# Patient Record
Sex: FEMALE | Race: BLACK OR AFRICAN AMERICAN | NOT HISPANIC OR LATINO | Employment: UNEMPLOYED | URBAN - METROPOLITAN AREA
[De-identification: names, ages, dates, MRNs, and addresses within clinical notes are randomized per-mention and may not be internally consistent; named-entity substitution may affect disease eponyms.]

---

## 2019-09-10 ENCOUNTER — HOSPITAL ENCOUNTER (EMERGENCY)
Facility: HOSPITAL | Age: 29
Discharge: HOME | End: 2019-09-11
Attending: EMERGENCY MEDICINE
Payer: MEDICAID

## 2019-09-10 DIAGNOSIS — J10.1 INFLUENZA B: Primary | ICD-10-CM

## 2019-09-10 DIAGNOSIS — R50.9 ACUTE FEBRILE ILLNESS: ICD-10-CM

## 2019-09-10 LAB
B-HCG UR QL: NEGATIVE
CTP QC/QA: YES
CTP QC/QA: YES
POC MOLECULAR INFLUENZA A AGN: NEGATIVE
POC MOLECULAR INFLUENZA B AGN: POSITIVE

## 2019-09-10 PROCEDURE — 87804 INFLUENZA ASSAY W/OPTIC: CPT | Mod: ER

## 2019-09-10 PROCEDURE — 99284 EMERGENCY DEPT VISIT MOD MDM: CPT | Mod: 25,ER

## 2019-09-10 PROCEDURE — 87502 INFLUENZA DNA AMP PROBE: CPT | Mod: ER

## 2019-09-10 PROCEDURE — 25000003 PHARM REV CODE 250: Mod: ER | Performed by: EMERGENCY MEDICINE

## 2019-09-10 PROCEDURE — 81025 URINE PREGNANCY TEST: CPT | Mod: ER | Performed by: EMERGENCY MEDICINE

## 2019-09-10 RX ORDER — ACETAMINOPHEN 325 MG/1
650 TABLET ORAL
Status: COMPLETED | OUTPATIENT
Start: 2019-09-10 | End: 2019-09-10

## 2019-09-10 RX ADMIN — ACETAMINOPHEN 650 MG: 325 TABLET, FILM COATED ORAL at 11:09

## 2019-09-11 VITALS
HEIGHT: 69 IN | TEMPERATURE: 99 F | BODY MASS INDEX: 43.4 KG/M2 | DIASTOLIC BLOOD PRESSURE: 88 MMHG | HEART RATE: 68 BPM | WEIGHT: 293 LBS | OXYGEN SATURATION: 96 % | RESPIRATION RATE: 20 BRPM | SYSTOLIC BLOOD PRESSURE: 140 MMHG

## 2019-09-11 RX ORDER — FLUTICASONE PROPIONATE 50 MCG
2 SPRAY, SUSPENSION (ML) NASAL DAILY
Qty: 16 G | Refills: 0 | OUTPATIENT
Start: 2019-09-11 | End: 2021-01-28

## 2019-09-11 RX ORDER — MONTELUKAST SODIUM 10 MG/1
10 TABLET ORAL NIGHTLY
Qty: 30 TABLET | Refills: 0 | Status: SHIPPED | OUTPATIENT
Start: 2019-09-11 | End: 2019-10-11

## 2019-09-11 RX ORDER — BENZONATATE 100 MG/1
100 CAPSULE ORAL 3 TIMES DAILY PRN
Qty: 20 CAPSULE | Refills: 0 | Status: SHIPPED | OUTPATIENT
Start: 2019-09-11 | End: 2019-09-21

## 2019-09-11 RX ORDER — PROMETHAZINE HYDROCHLORIDE AND DEXTROMETHORPHAN HYDROBROMIDE 6.25; 15 MG/5ML; MG/5ML
5 SYRUP ORAL NIGHTLY PRN
Qty: 118 ML | Refills: 0 | Status: SHIPPED | OUTPATIENT
Start: 2019-09-11 | End: 2019-09-21

## 2019-09-11 RX ORDER — ALBUTEROL SULFATE 90 UG/1
2 AEROSOL, METERED RESPIRATORY (INHALATION) EVERY 6 HOURS PRN
Qty: 1 INHALER | Refills: 0 | OUTPATIENT
Start: 2019-09-11 | End: 2022-08-16

## 2019-09-11 NOTE — ED PROVIDER NOTES
Encounter Date: 9/10/2019    SCRIBE #1 NOTE: I, Carmen Hernandez, am scribing for, and in the presence of,  Dr. Ward. I have scribed the following portions of the note - Other sections scribed: HPI, ROS, PE.       History     Chief Complaint   Patient presents with    Fever     pt c/o fever, sore throat and asthma attacks and cough x 3 day, pt states is triage that she missed 2 days of work     Zandra Phillips is a 29 y.o. Female with asthma who presents to the ED complaining of fever, sore throat and cough x3 days. Pt reports fever, congestion, and cough. Pt reports taking tylenol and naproxen with little relief. Pt denies SOB, CP or syncope.    The history is provided by the patient. No  was used.     Review of patient's allergies indicates:  No Known Allergies  Past Medical History:   Diagnosis Date    Asthma      Past Surgical History:   Procedure Laterality Date     SECTION      TONSILLECTOMY       Family History   Problem Relation Age of Onset    Diabetes Other     Hypertension Other      Social History     Tobacco Use    Smoking status: Current Some Day Smoker     Types: Cigarettes   Substance Use Topics    Alcohol use: Yes    Drug use: Not Currently     Review of Systems   Constitutional: Positive for fever. Negative for appetite change.   HENT: Positive for congestion, rhinorrhea and sore throat. Negative for trouble swallowing and voice change.    Respiratory: Positive for cough. Negative for shortness of breath.    Cardiovascular: Negative for chest pain.   Gastrointestinal: Negative for diarrhea, nausea and vomiting.   All other systems reviewed and are negative.      Physical Exam     Initial Vitals [09/10/19 2129]   BP Pulse Resp Temp SpO2   (!) 157/101 95 18 (!) 101.7 °F (38.7 °C) (!) 94 %      MAP       --         Physical Exam    Nursing note and vitals reviewed.  Constitutional: She appears well-developed and well-nourished.   HENT:   Head: Normocephalic and  atraumatic.   Nose: Mucosal edema and rhinorrhea present.   Eyes: Conjunctivae are normal.   Neck: Normal range of motion and phonation normal. Neck supple.   Cardiovascular: Normal rate and intact distal pulses.   Pulmonary/Chest: Effort normal and breath sounds normal. No stridor. No respiratory distress. She has no wheezes. She has no rhonchi. She has no rales.   Musculoskeletal: Normal range of motion.   Neurological: She is alert and oriented to person, place, and time.   Skin: Skin is warm and dry. Capillary refill takes less than 2 seconds. No rash noted.   Psychiatric: She has a normal mood and affect. Her behavior is normal.         ED Course   Procedures  Labs Reviewed   POCT INFLUENZA A/B MOLECULAR - Abnormal; Notable for the following components:       Result Value    POC Molecular Influenza B Ag Positive (*)     All other components within normal limits   POCT URINE PREGNANCY          Imaging Results          X-Ray Chest PA And Lateral (Final result)  Result time 09/11/19 00:01:34    Final result by Jimenez Lua MD (09/11/19 00:01:34)                 Impression:      No acute intrathoracic process identified.      Electronically signed by: Jimenez Lua MD  Date:    09/11/2019  Time:    00:01             Narrative:    EXAMINATION:  XR CHEST PA AND LATERAL    CLINICAL HISTORY:  Fever, unspecified    TECHNIQUE:  PA and lateral views of the chest were performed.    COMPARISON:  December 2015.    FINDINGS:  Cardiac silhouette is normal in size.  Lungs are symmetrically expanded.  No evidence of focal consolidative process, pneumothorax, or significant effusion.  No acute osseous abnormality identified.                                 Medical Decision Making:   History:   Old Medical Records: I decided to obtain old medical records.  Clinical Tests:   Lab Tests: Reviewed and Ordered  Radiological Study: Reviewed and Ordered    Labs Reviewed  Admission on 09/10/2019, Discharged on 09/11/2019   Component  Date Value Ref Range Status    POC Molecular Influenza A Ag 09/10/2019 Negative  Negative, Not Reported Final    POC Molecular Influenza B Ag 09/10/2019 Positive* Negative, Not Reported Final     Acceptable 09/10/2019 Yes   Final    POC Preg Test, Ur 09/10/2019 Negative  Negative Final     Acceptable 09/10/2019 Yes   Final        Imaging Reviewed    Imaging Results          X-Ray Chest PA And Lateral (Final result)  Result time 09/11/19 00:01:34    Final result by Jimenez Lua MD (09/11/19 00:01:34)                 Impression:      No acute intrathoracic process identified.      Electronically signed by: Jimenez Lua MD  Date:    09/11/2019  Time:    00:01             Narrative:    EXAMINATION:  XR CHEST PA AND LATERAL    CLINICAL HISTORY:  Fever, unspecified    TECHNIQUE:  PA and lateral views of the chest were performed.    COMPARISON:  December 2015.    FINDINGS:  Cardiac silhouette is normal in size.  Lungs are symmetrically expanded.  No evidence of focal consolidative process, pneumothorax, or significant effusion.  No acute osseous abnormality identified.                                Medications given in ED    Medications   acetaminophen tablet 650 mg (650 mg Oral Given 9/10/19 2300)       This document was produced by a scribe under my direction and in my presence. I agree with the content of the note and have made any necessary edits.     Bakari Ward MD         Note was created using voice recognition software. Note may have occasional typographical errors that may not have been identified and edited despite good mellisa initial review prior to signing.            Scribe Attestation:   Scribe #1: I performed the above scribed service and the documentation accurately describes the services I performed. I attest to the accuracy of the note.           Discharge Medications     Discharge Medication List as of 9/11/2019 12:45 AM      START taking these medications     Details   albuterol (PROVENTIL/VENTOLIN HFA) 90 mcg/actuation inhaler Inhale 2 puffs into the lungs every 6 (six) hours as needed for Wheezing or Shortness of Breath., Starting Wed 9/11/2019, Normal      benzonatate (TESSALON) 100 MG capsule Take 1 capsule (100 mg total) by mouth 3 (three) times daily as needed for Cough., Starting Wed 9/11/2019, Until Sat 9/21/2019, Normal      fluticasone propionate (FLONASE) 50 mcg/actuation nasal spray 2 sprays (100 mcg total) by Each Nostril route once daily., Starting Wed 9/11/2019, Normal      montelukast (SINGULAIR) 10 mg tablet Take 1 tablet (10 mg total) by mouth every evening., Starting Wed 9/11/2019, Until Fri 10/11/2019, Normal      promethazine-dextromethorphan (PROMETHAZINE-DM) 6.25-15 mg/5 mL Syrp Take 5 mLs by mouth nightly as needed (cough)., Starting Wed 9/11/2019, Until Sat 9/21/2019, Normal                   Patient discharged to home in stable condition with instructions to:   1. Please take all meds as prescribed.  2. Follow-up with your primary care doctor   3. Return precautions discussed and patient and/or family/caretaker understands to return to the emergency room for any concerns including worsening of your current symptoms, fever, chills, night sweats, worsening pain, chest pain, shortness of breath, nausea, vomiting, diarrhea, bleeding, headache, difficulty talking, visual disturbances, weakness, numbness or any other acute concerns          Clinical Impression:     1. Influenza B    2. Acute febrile illness            Disposition:   Disposition: Discharged  Condition: Stable                        Bakari Ward MD  09/16/19 4395

## 2020-09-18 ENCOUNTER — HOSPITAL ENCOUNTER (EMERGENCY)
Facility: HOSPITAL | Age: 30
Discharge: HOME | End: 2020-09-18
Attending: INTERNAL MEDICINE
Payer: MEDICAID

## 2020-09-18 VITALS
DIASTOLIC BLOOD PRESSURE: 97 MMHG | TEMPERATURE: 98 F | BODY MASS INDEX: 43.4 KG/M2 | WEIGHT: 293 LBS | HEART RATE: 95 BPM | RESPIRATION RATE: 18 BRPM | HEIGHT: 69 IN | SYSTOLIC BLOOD PRESSURE: 150 MMHG | OXYGEN SATURATION: 98 %

## 2020-09-18 DIAGNOSIS — Z04.1 EXAM FOLLOWING MVC (MOTOR VEHICLE COLLISION), NO APPARENT INJURY: Primary | ICD-10-CM

## 2020-09-18 DIAGNOSIS — M79.631 RIGHT FOREARM PAIN: ICD-10-CM
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
## 2020-09-18 LAB
B-HCG UR QL: NEGATIVE
CTP QC/QA: YES

## 2020-09-18 PROCEDURE — 25000003 PHARM REV CODE 250: Mod: ER | Performed by: INTERNAL MEDICINE

## 2020-09-18 PROCEDURE — 99284 EMERGENCY DEPT VISIT MOD MDM: CPT | Mod: 25,ER

## 2020-09-18 PROCEDURE — 81025 URINE PREGNANCY TEST: CPT | Mod: ER | Performed by: INTERNAL MEDICINE

## 2020-09-18 RX ORDER — METHOCARBAMOL 750 MG/1
1500 TABLET, FILM COATED ORAL 3 TIMES DAILY
Qty: 30 TABLET | Refills: 0 | Status: SHIPPED | OUTPATIENT
Start: 2020-09-18 | End: 2020-09-23

## 2020-09-18 RX ORDER — IBUPROFEN 400 MG/1
800 TABLET ORAL
Status: COMPLETED | OUTPATIENT
Start: 2020-09-18 | End: 2020-09-18

## 2020-09-18 RX ORDER — IBUPROFEN 800 MG/1
800 TABLET ORAL EVERY 8 HOURS PRN
Qty: 30 TABLET | Refills: 0 | Status: SHIPPED | OUTPATIENT
Start: 2020-09-18 | End: 2021-01-28 | Stop reason: ALTCHOICE

## 2020-09-18 RX ORDER — METHOCARBAMOL 750 MG/1
1500 TABLET, FILM COATED ORAL
Status: COMPLETED | OUTPATIENT
Start: 2020-09-18 | End: 2020-09-18

## 2020-09-18 RX ADMIN — IBUPROFEN 800 MG: 400 TABLET ORAL at 11:09

## 2020-09-18 RX ADMIN — METHOCARBAMOL 1500 MG: 750 TABLET ORAL at 11:09
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
## 2021-01-28 ENCOUNTER — HOSPITAL ENCOUNTER (EMERGENCY)
Facility: HOSPITAL | Age: 31
Discharge: HOME | End: 2021-01-28
Attending: EMERGENCY MEDICINE
Payer: MEDICAID

## 2021-01-28 VITALS
HEART RATE: 79 BPM | SYSTOLIC BLOOD PRESSURE: 131 MMHG | WEIGHT: 293 LBS | TEMPERATURE: 98 F | OXYGEN SATURATION: 99 % | DIASTOLIC BLOOD PRESSURE: 73 MMHG | HEIGHT: 69 IN | RESPIRATION RATE: 18 BRPM | BODY MASS INDEX: 43.4 KG/M2

## 2021-01-28 DIAGNOSIS — R51.9 SINUS HEADACHE: Primary | ICD-10-CM

## 2021-01-28 DIAGNOSIS — S39.012A BACK STRAIN, INITIAL ENCOUNTER: ICD-10-CM

## 2021-01-28 LAB
B-HCG UR QL: NEGATIVE
CTP QC/QA: YES

## 2021-01-28 PROCEDURE — 99284 EMERGENCY DEPT VISIT MOD MDM: CPT | Mod: 25,ER

## 2021-01-28 PROCEDURE — 81025 URINE PREGNANCY TEST: CPT | Mod: ER | Performed by: PHYSICIAN ASSISTANT

## 2021-01-28 PROCEDURE — 25000003 PHARM REV CODE 250: Mod: ER | Performed by: EMERGENCY MEDICINE

## 2021-01-28 RX ORDER — METHOCARBAMOL 750 MG/1
1500 TABLET, FILM COATED ORAL EVERY 6 HOURS
Qty: 24 TABLET | Refills: 0 | Status: SHIPPED | OUTPATIENT
Start: 2021-01-28 | End: 2021-01-31

## 2021-01-28 RX ORDER — ACETAMINOPHEN 500 MG
1000 TABLET ORAL
Status: COMPLETED | OUTPATIENT
Start: 2021-01-28 | End: 2021-01-28

## 2021-01-28 RX ORDER — LORATADINE 10 MG/1
10 TABLET ORAL DAILY
Qty: 60 TABLET | Refills: 0 | OUTPATIENT
Start: 2021-01-28 | End: 2022-08-16

## 2021-01-28 RX ORDER — MONTELUKAST SODIUM 10 MG/1
10 TABLET ORAL NIGHTLY
Qty: 30 TABLET | Refills: 0 | Status: SHIPPED | OUTPATIENT
Start: 2021-01-28 | End: 2021-02-27

## 2021-01-28 RX ORDER — FLUTICASONE PROPIONATE 50 MCG
2 SPRAY, SUSPENSION (ML) NASAL DAILY
Qty: 16 G | Refills: 0 | OUTPATIENT
Start: 2021-01-28 | End: 2022-08-16

## 2021-01-28 RX ORDER — METHYLPREDNISOLONE 4 MG/1
TABLET ORAL
Qty: 1 PACKAGE | Refills: 0 | Status: SHIPPED | OUTPATIENT
Start: 2021-01-28 | End: 2021-02-18

## 2021-01-28 RX ORDER — KETOROLAC TROMETHAMINE 10 MG/1
10 TABLET, FILM COATED ORAL EVERY 6 HOURS PRN
Qty: 12 TABLET | Refills: 0 | Status: SHIPPED | OUTPATIENT
Start: 2021-01-28 | End: 2021-01-31

## 2021-01-28 RX ADMIN — ACETAMINOPHEN 1000 MG: 500 TABLET ORAL at 07:01

## 2021-04-16 ENCOUNTER — PATIENT MESSAGE (OUTPATIENT)
Dept: RESEARCH | Facility: HOSPITAL | Age: 31
End: 2021-04-16

## 2022-08-16 ENCOUNTER — HOSPITAL ENCOUNTER (EMERGENCY)
Facility: HOSPITAL | Age: 32
Discharge: HOME | End: 2022-08-16
Attending: EMERGENCY MEDICINE
Payer: MEDICAID

## 2022-08-16 VITALS
DIASTOLIC BLOOD PRESSURE: 95 MMHG | OXYGEN SATURATION: 98 % | BODY MASS INDEX: 59.07 KG/M2 | RESPIRATION RATE: 16 BRPM | TEMPERATURE: 98 F | WEIGHT: 293 LBS | SYSTOLIC BLOOD PRESSURE: 150 MMHG | HEART RATE: 85 BPM

## 2022-08-16 DIAGNOSIS — M25.462 EFFUSION OF LEFT KNEE: ICD-10-CM

## 2022-08-16 DIAGNOSIS — M25.562 LEFT KNEE PAIN: Primary | ICD-10-CM

## 2022-08-16 DIAGNOSIS — M17.12 OSTEOARTHRITIS OF LEFT KNEE, UNSPECIFIED OSTEOARTHRITIS TYPE: ICD-10-CM

## 2022-08-16 LAB
B-HCG UR QL: NEGATIVE
CTP QC/QA: YES

## 2022-08-16 PROCEDURE — 25000003 PHARM REV CODE 250: Mod: ER | Performed by: NURSE PRACTITIONER

## 2022-08-16 PROCEDURE — 81025 URINE PREGNANCY TEST: CPT | Mod: ER | Performed by: NURSE PRACTITIONER

## 2022-08-16 PROCEDURE — 99284 EMERGENCY DEPT VISIT MOD MDM: CPT | Mod: ER

## 2022-08-16 RX ORDER — IBUPROFEN 600 MG/1
600 TABLET ORAL
Status: COMPLETED | OUTPATIENT
Start: 2022-08-16 | End: 2022-08-16

## 2022-08-16 RX ORDER — DICLOFENAC SODIUM 10 MG/G
2 GEL TOPICAL 4 TIMES DAILY
Qty: 100 G | Refills: 0 | Status: SHIPPED | OUTPATIENT
Start: 2022-08-16 | End: 2022-08-30

## 2022-08-16 RX ORDER — IBUPROFEN 600 MG/1
600 TABLET ORAL EVERY 6 HOURS PRN
Qty: 20 TABLET | Refills: 0 | Status: SHIPPED | OUTPATIENT
Start: 2022-08-16 | End: 2022-08-21

## 2022-08-16 RX ORDER — METHOCARBAMOL 500 MG/1
1000 TABLET, FILM COATED ORAL 3 TIMES DAILY
Qty: 30 TABLET | Refills: 0 | Status: SHIPPED | OUTPATIENT
Start: 2022-08-16 | End: 2022-08-21

## 2022-08-16 RX ORDER — DEXTROMETHORPHAN HYDROBROMIDE, GUAIFENESIN 5; 100 MG/5ML; MG/5ML
650 LIQUID ORAL EVERY 8 HOURS
Qty: 20 TABLET | Refills: 0 | Status: SHIPPED | OUTPATIENT
Start: 2022-08-16 | End: 2022-08-21

## 2022-08-16 RX ADMIN — IBUPROFEN 600 MG: 600 TABLET ORAL at 08:08

## 2022-08-16 NOTE — ED PROVIDER NOTES
"Encounter Date: 2022    SCRIBE #1 NOTE: I, Rachel Rodriguessara, am scribing for, and in the presence of,  NAHOMY Robins. I have scribed the following portions of the note - Other sections scribed: HPI, ROS, PE.       History     Chief Complaint   Patient presents with    Knee Pain     Pt states," My left knee has been hurting for weeks, my right knee is hurting now also."     32 year old female with history of Asthma presents to the ED with complaints of bilateral knee pain beginning two weeks ago. Pt notes associated symptoms of bilateral knee swelling, warmth, stiffness, and previous left knee sprain that occurred 'a while ago'. Pt states the pain is worse in the left knee and states the pain is exacerbated with movement. Patient denies rash, fever, chest pain, SOB, numbness, weakness, tingling, abdominal pain, back pain, dysuria, hematuria, nausea, vomiting, diarrhea, or any other complaints. She rates her pain as 9/10 and has not taken any medications for her symptoms today.  Pt does not endorse smoking or drug use. Endorses alcohol use. No known allergies.         The history is provided by the patient. No  was used.     Review of patient's allergies indicates:  No Known Allergies  Past Medical History:   Diagnosis Date    Asthma      Past Surgical History:   Procedure Laterality Date     SECTION      TONSILLECTOMY       Family History   Problem Relation Age of Onset    Diabetes Other     Hypertension Other      Social History     Tobacco Use    Smoking status: Current Some Day Smoker     Types: Cigarettes    Smokeless tobacco: Never Used   Substance Use Topics    Alcohol use: Yes    Drug use: Not Currently     Review of Systems   Constitutional: Negative for chills and fever.        (+) Warmth  (+) Left knee injury   HENT: Negative for congestion, ear pain, rhinorrhea, sore throat and trouble swallowing.    Eyes: Negative for pain, discharge and redness. "   Respiratory: Negative for cough and shortness of breath.    Cardiovascular: Negative for chest pain.   Gastrointestinal: Negative for abdominal pain, diarrhea, nausea and vomiting.   Genitourinary: Negative for decreased urine volume and dysuria.   Musculoskeletal: Positive for arthralgias (Bilateral Knee Pain) and joint swelling (Bilateral Knees). Negative for back pain, neck pain and neck stiffness.        (+) Bilateral knee stiffness   Skin: Negative for rash.   Neurological: Negative for dizziness, weakness, light-headedness, numbness and headaches.   Psychiatric/Behavioral: Negative for confusion.       Physical Exam     Initial Vitals [08/16/22 0819]   BP Pulse Resp Temp SpO2   (!) 150/95 85 16 98.2 °F (36.8 °C) 98 %      MAP       --         Physical Exam    Nursing note and vitals reviewed.  Constitutional: She appears well-developed.  Non-toxic appearance. She does not appear ill.   HENT:   Head: Normocephalic and atraumatic.   Right Ear: External ear normal.   Left Ear: External ear normal.   Nose: Nose normal.   Eyes: Conjunctivae are normal.   Neck:   Normal range of motion.  Cardiovascular: Normal rate and regular rhythm.   Pulses:       Dorsalis pedis pulses are 2+ on the left side.        Posterior tibial pulses are 2+ on the left side.   Pulmonary/Chest: Effort normal and breath sounds normal. She exhibits no tenderness.   Musculoskeletal:      Cervical back: Normal range of motion.      Left knee: No swelling or erythema. Normal range of motion. Tenderness present.      Instability Tests: Anterior drawer test negative. Posterior drawer test negative.      Comments: Generalized left knee tenderness. No swelling, erythema, warmth to touch, bruising, or rash. Skin intact. Normal ROM and normal gait. Negative anterior and posterior drawer. +2 DP/PT pulse; normal gait     Neurological: She is alert and oriented to person, place, and time. Gait normal. GCS eye subscore is 4. GCS verbal subscore is 5.  GCS motor subscore is 6.   Skin: Skin is warm, dry and intact. No bruising and no rash noted. No erythema.   Psychiatric: She has a normal mood and affect. Her speech is normal and behavior is normal. Judgment and thought content normal.         ED Course   Procedures  Labs Reviewed   POCT URINE PREGNANCY          Imaging Results          X-Ray Knee 3 View Left (Final result)  Result time 08/16/22 10:03:27    Final result by Tigre Muse MD (08/16/22 10:03:27)                 Impression:      No fracture dislocation.  Additional findings above.      Electronically signed by: Tigre Muse MD  Date:    08/16/2022  Time:    10:03             Narrative:    EXAMINATION:  XR KNEE 3 VIEW LEFT    CLINICAL HISTORY:  Pain in left knee    TECHNIQUE:  AP, lateral, and Merchant views of the left knee were performed.    COMPARISON:  None    FINDINGS:  Advanced tricompartment DJD change particularly lateral compartment with exuberant osteophytes, joint space narrowing and cortical regularity.  Evidence of subcutaneous edema, genu valgum deformity and small suprapatellar joint effusion.  Less prominent DJD changes medial compartment patellofemoral joint..                                 Medications   ibuprofen tablet 600 mg (600 mg Oral Given 8/16/22 0855)     Medical Decision Making:   Independently Interpreted Test(s):   I have ordered and independently interpreted X-rays - see prior notes.  Clinical Tests:   Lab Tests: Ordered and Reviewed  The following lab test(s) were unremarkable: UPT  Radiological Study: Ordered and Reviewed       APC / Resident Notes:   This is an evaluation of a 32 y.o. female that presents to the Emergency Department for left knee pain    Physical Exam shows a non-toxic, afebrile, and well appearing female. Generalized left knee tenderness. No swelling, erythema, warmth to touch, bruising, or rash. Skin intact. Normal ROM and normal gait. Negative anterior and posterior drawer. +2 DP/PT  pulse; normal gait    Vital signs are reassuring. If available, previous records reviewed.   RESULTS: UPT negative; Xray showed DJD and small joint effusion    My overall impression is Left knee pain. I considered, but at this time, do not suspect fracture, dislocation, abscess, cellulitis.    ED Course: UPT, Ibuprofen, Xray. Discharge Meds/Instructions: tylenol, Ibuprofen, Robaxin, Voltaren gel, Ortho referral. The diagnosis, treatment plan, instructions for follow-up as well as ED return precautions were discussed. All questions have been answered.            Scribe Attestation:   Scribe #1: I performed the above scribed service and the documentation accurately describes the services I performed. I attest to the accuracy of the note.               I, SONG Dutton, personally performed the services described in this documentation.  All medical record entries made by the scribe were at my direction and in my presence.  I have reviewed the chart and agree that the record reflects my personal performance and is accurate and complete.  Clinical Impression:   Final diagnoses:  [M25.562] Left knee pain (Primary)  [M17.12] Osteoarthritis of left knee, unspecified osteoarthritis type  [M25.462] Effusion of left knee          ED Disposition Condition    Discharge Stable        ED Prescriptions     Medication Sig Dispense Start Date End Date Auth. Provider    diclofenac sodium (VOLTAREN) 1 % Gel Apply 2 g topically 4 (four) times daily. for 14 days 100 g 8/16/2022 8/30/2022 NAHOMY August    acetaminophen (TYLENOL) 650 MG TbSR Take 1 tablet (650 mg total) by mouth every 8 (eight) hours. for 5 days 20 tablet 8/16/2022 8/21/2022 NAHOMY August    ibuprofen (ADVIL,MOTRIN) 600 MG tablet Take 1 tablet (600 mg total) by mouth every 6 (six) hours as needed for Pain. 20 tablet 8/16/2022 8/21/2022 NAHOMY August    methocarbamoL (ROBAXIN) 500 MG Tab Take 2 tablets (1,000 mg total) by mouth 3 (three) times daily. for 5  days 30 tablet 8/16/2022 8/21/2022 NAHOMY August        Follow-up Information     Follow up With Specialties Details Why Contact Info    [facility] - Musculosketetal Neuromusculoskeletal Medicine Schedule an appointment as soon as possible for a visit in 2 days  [address] [facility] [address]  [phone]      [facility] ED Emergency Medicine Go to  If symptoms worsen [address] [facility] [id number] [phone]           NAHOMY August  08/16/22 1010

## 2022-08-16 NOTE — Clinical Note
"Zandra Kennedyady Phillips was seen and treated in our emergency department on 8/16/2022.  She may return to work on 08/18/2022.       If you have any questions or concerns, please don't hesitate to call.      NAHOMY August"

## 2022-09-11 ENCOUNTER — HOSPITAL ENCOUNTER (INPATIENT)
Facility: HOSPITAL | Age: 32
LOS: 3 days | Discharge: HOME | DRG: 193 | End: 2022-09-14
Attending: EMERGENCY MEDICINE | Admitting: HOSPITALIST
Payer: MEDICAID

## 2022-09-11 DIAGNOSIS — J45.41 MODERATE PERSISTENT ASTHMA WITH EXACERBATION: ICD-10-CM

## 2022-09-11 DIAGNOSIS — J18.9 PNEUMONIA OF LEFT LOWER LOBE DUE TO INFECTIOUS ORGANISM: Primary | ICD-10-CM

## 2022-09-11 DIAGNOSIS — R07.9 CHEST PAIN: ICD-10-CM

## 2022-09-11 DIAGNOSIS — R06.00 DYSPNEA, UNSPECIFIED TYPE: ICD-10-CM

## 2022-09-11 LAB
ALBUMIN SERPL-MCNC: 3.6 G/DL (ref 3.3–5.5)
ALLENS TEST: ABNORMAL
ALP SERPL-CCNC: 61 U/L (ref 42–141)
B-HCG UR QL: NEGATIVE
BILIRUB SERPL-MCNC: 0.6 MG/DL (ref 0.2–1.6)
BUN SERPL-MCNC: 9 MG/DL (ref 7–22)
CALCIUM SERPL-MCNC: 9.3 MG/DL (ref 8–10.3)
CHLORIDE SERPL-SCNC: 105 MMOL/L (ref 98–108)
CREAT SERPL-MCNC: 0.8 MG/DL (ref 0.6–1.2)
CTP QC/QA: YES
CTP QC/QA: YES
DELSYS: ABNORMAL
GLUCOSE SERPL-MCNC: 143 MG/DL (ref 73–118)
HCO3 UR-SCNC: 18.4 MMOL/L (ref 24–28)
INFLUENZA A ANTIGEN, POC: NEGATIVE
INFLUENZA B ANTIGEN, POC: NEGATIVE
LDH SERPL L TO P-CCNC: 1.06 MMOL/L (ref 0.5–2.2)
PCO2 BLDA: 27.7 MMHG (ref 35–45)
PH SMN: 7.43 [PH] (ref 7.35–7.45)
PO2 BLDA: 49 MMHG (ref 40–60)
POC ALT (SGPT): <5 U/L (ref 10–47)
POC AST (SGOT): 23 U/L (ref 11–38)
POC BE: -4 MMOL/L
POC SATURATED O2: 86 % (ref 95–100)
POC TCO2: 19 MMOL/L (ref 18–33)
POC TCO2: 19 MMOL/L (ref 24–29)
POTASSIUM BLD-SCNC: 3.8 MMOL/L (ref 3.6–5.1)
PROTEIN, POC: 7.1 G/DL (ref 6.4–8.1)
SAMPLE: ABNORMAL
SARS-COV-2 RDRP RESP QL NAA+PROBE: NEGATIVE
SITE: ABNORMAL
SODIUM BLD-SCNC: 134 MMOL/L (ref 128–145)

## 2022-09-11 PROCEDURE — U0002 COVID-19 LAB TEST NON-CDC: HCPCS | Mod: ER | Performed by: NURSE PRACTITIONER

## 2022-09-11 PROCEDURE — 94761 N-INVAS EAR/PLS OXIMETRY MLT: CPT | Mod: ER

## 2022-09-11 PROCEDURE — 85025 COMPLETE CBC W/AUTO DIFF WBC: CPT | Mod: ER

## 2022-09-11 PROCEDURE — 27000221 HC OXYGEN, UP TO 24 HOURS: Mod: ER

## 2022-09-11 PROCEDURE — 87040 BLOOD CULTURE FOR BACTERIA: CPT | Performed by: NURSE PRACTITIONER

## 2022-09-11 PROCEDURE — 63600175 PHARM REV CODE 636 W HCPCS: Mod: ER | Performed by: NURSE PRACTITIONER

## 2022-09-11 PROCEDURE — 94640 AIRWAY INHALATION TREATMENT: CPT | Mod: ER,XB

## 2022-09-11 PROCEDURE — 25000242 PHARM REV CODE 250 ALT 637 W/ HCPCS: Mod: ER | Performed by: NURSE PRACTITIONER

## 2022-09-11 PROCEDURE — 94760 N-INVAS EAR/PLS OXIMETRY 1: CPT | Mod: ER

## 2022-09-11 PROCEDURE — 21400001 HC TELEMETRY ROOM

## 2022-09-11 PROCEDURE — 82803 BLOOD GASES ANY COMBINATION: CPT | Mod: ER

## 2022-09-11 PROCEDURE — 96372 THER/PROPH/DIAG INJ SC/IM: CPT | Performed by: NURSE PRACTITIONER

## 2022-09-11 PROCEDURE — 81025 URINE PREGNANCY TEST: CPT | Mod: ER | Performed by: NURSE PRACTITIONER

## 2022-09-11 PROCEDURE — 80053 COMPREHEN METABOLIC PANEL: CPT | Mod: ER

## 2022-09-11 RX ORDER — LANOLIN ALCOHOL/MO/W.PET/CERES
800 CREAM (GRAM) TOPICAL
Status: DISCONTINUED | OUTPATIENT
Start: 2022-09-11 | End: 2022-09-14 | Stop reason: HOSPADM

## 2022-09-11 RX ORDER — NALOXONE HCL 0.4 MG/ML
0.02 VIAL (ML) INJECTION
Status: DISCONTINUED | OUTPATIENT
Start: 2022-09-11 | End: 2022-09-14 | Stop reason: HOSPADM

## 2022-09-11 RX ORDER — AMOXICILLIN 250 MG
1 CAPSULE ORAL DAILY PRN
Status: DISCONTINUED | OUTPATIENT
Start: 2022-09-11 | End: 2022-09-14 | Stop reason: HOSPADM

## 2022-09-11 RX ORDER — TALC
6 POWDER (GRAM) TOPICAL NIGHTLY PRN
Status: DISCONTINUED | OUTPATIENT
Start: 2022-09-11 | End: 2022-09-13

## 2022-09-11 RX ORDER — GLUCAGON 1 MG
1 KIT INJECTION
Status: DISCONTINUED | OUTPATIENT
Start: 2022-09-11 | End: 2022-09-14 | Stop reason: HOSPADM

## 2022-09-11 RX ORDER — SODIUM CHLORIDE 0.9 % (FLUSH) 0.9 %
10 SYRINGE (ML) INJECTION EVERY 8 HOURS
Status: DISCONTINUED | OUTPATIENT
Start: 2022-09-11 | End: 2022-09-14 | Stop reason: HOSPADM

## 2022-09-11 RX ORDER — DOXYCYCLINE HYCLATE 100 MG
100 TABLET ORAL EVERY 12 HOURS
Status: DISCONTINUED | OUTPATIENT
Start: 2022-09-12 | End: 2022-09-12

## 2022-09-11 RX ORDER — KETOROLAC TROMETHAMINE 30 MG/ML
15 INJECTION, SOLUTION INTRAMUSCULAR; INTRAVENOUS
Status: COMPLETED | OUTPATIENT
Start: 2022-09-11 | End: 2022-09-11

## 2022-09-11 RX ORDER — ACETAMINOPHEN 325 MG/1
650 TABLET ORAL EVERY 4 HOURS PRN
Status: DISCONTINUED | OUTPATIENT
Start: 2022-09-11 | End: 2022-09-14 | Stop reason: HOSPADM

## 2022-09-11 RX ORDER — IBUPROFEN 200 MG
24 TABLET ORAL
Status: DISCONTINUED | OUTPATIENT
Start: 2022-09-11 | End: 2022-09-14 | Stop reason: HOSPADM

## 2022-09-11 RX ORDER — METHYLPREDNISOLONE SOD SUCC 125 MG
125 VIAL (EA) INJECTION
Status: COMPLETED | OUTPATIENT
Start: 2022-09-11 | End: 2022-09-11

## 2022-09-11 RX ORDER — ALBUTEROL SULFATE 2.5 MG/.5ML
2.5 SOLUTION RESPIRATORY (INHALATION) EVERY 4 HOURS PRN
Status: DISCONTINUED | OUTPATIENT
Start: 2022-09-11 | End: 2022-09-14 | Stop reason: HOSPADM

## 2022-09-11 RX ORDER — IBUPROFEN 200 MG
16 TABLET ORAL
Status: DISCONTINUED | OUTPATIENT
Start: 2022-09-11 | End: 2022-09-14 | Stop reason: HOSPADM

## 2022-09-11 RX ORDER — IPRATROPIUM BROMIDE AND ALBUTEROL SULFATE 2.5; .5 MG/3ML; MG/3ML
3 SOLUTION RESPIRATORY (INHALATION)
Status: COMPLETED | OUTPATIENT
Start: 2022-09-11 | End: 2022-09-11

## 2022-09-11 RX ADMIN — IPRATROPIUM BROMIDE AND ALBUTEROL SULFATE 3 ML: .5; 3 SOLUTION RESPIRATORY (INHALATION) at 04:09

## 2022-09-11 RX ADMIN — PIPERACILLIN AND TAZOBACTAM 4.5 G: 4; .5 INJECTION, POWDER, LYOPHILIZED, FOR SOLUTION INTRAVENOUS; PARENTERAL at 06:09

## 2022-09-11 RX ADMIN — KETOROLAC TROMETHAMINE 15 MG: 30 INJECTION, SOLUTION INTRAMUSCULAR; INTRAVENOUS at 07:09

## 2022-09-11 RX ADMIN — ALBUTEROL SULFATE 2.5 MG: 2.5 SOLUTION RESPIRATORY (INHALATION) at 05:09

## 2022-09-11 RX ADMIN — METHYLPREDNISOLONE SODIUM SUCCINATE 125 MG: 125 INJECTION, POWDER, FOR SOLUTION INTRAMUSCULAR; INTRAVENOUS at 04:09

## 2022-09-11 RX ADMIN — IPRATROPIUM BROMIDE AND ALBUTEROL SULFATE 3 ML: .5; 3 SOLUTION RESPIRATORY (INHALATION) at 05:09

## 2022-09-12 PROBLEM — J45.909 ASTHMA: Status: ACTIVE | Noted: 2022-09-12

## 2022-09-12 PROBLEM — I10 HTN (HYPERTENSION): Status: ACTIVE | Noted: 2022-09-12

## 2022-09-12 PROBLEM — E66.01 MORBID OBESITY: Status: ACTIVE | Noted: 2022-09-12

## 2022-09-12 PROBLEM — J18.9 PNEUMONIA: Status: ACTIVE | Noted: 2022-09-12

## 2022-09-12 LAB
ALBUMIN SERPL BCP-MCNC: 3.2 G/DL (ref 3.5–5.2)
ALP SERPL-CCNC: 59 U/L (ref 55–135)
ALT SERPL W/O P-5'-P-CCNC: 10 U/L (ref 10–44)
ANION GAP SERPL CALC-SCNC: 12 MMOL/L (ref 8–16)
AST SERPL-CCNC: 15 U/L (ref 10–40)
BASOPHILS # BLD AUTO: 0.01 K/UL (ref 0–0.2)
BASOPHILS NFR BLD: 0.1 % (ref 0–1.9)
BILIRUB SERPL-MCNC: 0.4 MG/DL (ref 0.1–1)
BUN SERPL-MCNC: 12 MG/DL (ref 6–20)
CALCIUM SERPL-MCNC: 8.8 MG/DL (ref 8.7–10.5)
CHLORIDE SERPL-SCNC: 107 MMOL/L (ref 95–110)
CO2 SERPL-SCNC: 18 MMOL/L (ref 23–29)
CREAT SERPL-MCNC: 0.8 MG/DL (ref 0.5–1.4)
DIFFERENTIAL METHOD: ABNORMAL
EOSINOPHIL # BLD AUTO: 0 K/UL (ref 0–0.5)
EOSINOPHIL NFR BLD: 0 % (ref 0–8)
ERYTHROCYTE [DISTWIDTH] IN BLOOD BY AUTOMATED COUNT: 13.8 % (ref 11.5–14.5)
EST. GFR  (NO RACE VARIABLE): >60 ML/MIN/1.73 M^2
GLUCOSE SERPL-MCNC: 193 MG/DL (ref 70–110)
HCT VFR BLD AUTO: 41.9 % (ref 37–48.5)
HGB BLD-MCNC: 13.9 G/DL (ref 12–16)
IMM GRANULOCYTES # BLD AUTO: 0.05 K/UL (ref 0–0.04)
IMM GRANULOCYTES NFR BLD AUTO: 0.5 % (ref 0–0.5)
LYMPHOCYTES # BLD AUTO: 1 K/UL (ref 1–4.8)
LYMPHOCYTES NFR BLD: 10.4 % (ref 18–48)
MCH RBC QN AUTO: 28.4 PG (ref 27–31)
MCHC RBC AUTO-ENTMCNC: 33.2 G/DL (ref 32–36)
MCV RBC AUTO: 86 FL (ref 82–98)
MONOCYTES # BLD AUTO: 0.3 K/UL (ref 0.3–1)
MONOCYTES NFR BLD: 3.3 % (ref 4–15)
NEUTROPHILS # BLD AUTO: 8 K/UL (ref 1.8–7.7)
NEUTROPHILS NFR BLD: 85.7 % (ref 38–73)
NRBC BLD-RTO: 0 /100 WBC
PLATELET # BLD AUTO: 289 K/UL (ref 150–450)
PMV BLD AUTO: 9.2 FL (ref 9.2–12.9)
POTASSIUM SERPL-SCNC: 3.7 MMOL/L (ref 3.5–5.1)
PROT SERPL-MCNC: 7.4 G/DL (ref 6–8.4)
RBC # BLD AUTO: 4.89 M/UL (ref 4–5.4)
SODIUM SERPL-SCNC: 137 MMOL/L (ref 136–145)
WBC # BLD AUTO: 9.29 K/UL (ref 3.9–12.7)

## 2022-09-12 PROCEDURE — 25000003 PHARM REV CODE 250: Performed by: NURSE PRACTITIONER

## 2022-09-12 PROCEDURE — 25000003 PHARM REV CODE 250: Performed by: PHYSICIAN ASSISTANT

## 2022-09-12 PROCEDURE — 63700000 PHARM REV CODE 250 ALT 637 W/O HCPCS: Performed by: NURSE PRACTITIONER

## 2022-09-12 PROCEDURE — 96372 THER/PROPH/DIAG INJ SC/IM: CPT | Performed by: NURSE PRACTITIONER

## 2022-09-12 PROCEDURE — 94664 DEMO&/EVAL PT USE INHALER: CPT | Mod: XB

## 2022-09-12 PROCEDURE — G0378 HOSPITAL OBSERVATION PER HR: HCPCS

## 2022-09-12 PROCEDURE — 36415 COLL VENOUS BLD VENIPUNCTURE: CPT | Performed by: PHYSICIAN ASSISTANT

## 2022-09-12 PROCEDURE — 27100107 HC POCKET PEAK FLOW METER

## 2022-09-12 PROCEDURE — 80053 COMPREHEN METABOLIC PANEL: CPT | Performed by: PHYSICIAN ASSISTANT

## 2022-09-12 PROCEDURE — 25000242 PHARM REV CODE 250 ALT 637 W/ HCPCS: Performed by: NURSE PRACTITIONER

## 2022-09-12 PROCEDURE — 63600175 PHARM REV CODE 636 W HCPCS: Performed by: NURSE PRACTITIONER

## 2022-09-12 PROCEDURE — 96366 THER/PROPH/DIAG IV INF ADDON: CPT

## 2022-09-12 PROCEDURE — 85025 COMPLETE CBC W/AUTO DIFF WBC: CPT | Performed by: PHYSICIAN ASSISTANT

## 2022-09-12 PROCEDURE — 96375 TX/PRO/DX INJ NEW DRUG ADDON: CPT

## 2022-09-12 PROCEDURE — 63600175 PHARM REV CODE 636 W HCPCS: Performed by: PHYSICIAN ASSISTANT

## 2022-09-12 PROCEDURE — 94640 AIRWAY INHALATION TREATMENT: CPT | Mod: XB

## 2022-09-12 PROCEDURE — 27000221 HC OXYGEN, UP TO 24 HOURS

## 2022-09-12 PROCEDURE — 99285 EMERGENCY DEPT VISIT HI MDM: CPT | Mod: 25

## 2022-09-12 PROCEDURE — 94760 N-INVAS EAR/PLS OXIMETRY 1: CPT

## 2022-09-12 PROCEDURE — A4216 STERILE WATER/SALINE, 10 ML: HCPCS | Performed by: PHYSICIAN ASSISTANT

## 2022-09-12 PROCEDURE — 27000646 HC AEROBIKA DEVICE

## 2022-09-12 PROCEDURE — 96365 THER/PROPH/DIAG IV INF INIT: CPT

## 2022-09-12 PROCEDURE — 21400001 HC TELEMETRY ROOM

## 2022-09-12 PROCEDURE — 99900035 HC TECH TIME PER 15 MIN (STAT)

## 2022-09-12 RX ORDER — BENZONATATE 100 MG/1
100 CAPSULE ORAL 3 TIMES DAILY PRN
Status: DISCONTINUED | OUTPATIENT
Start: 2022-09-12 | End: 2022-09-14 | Stop reason: HOSPADM

## 2022-09-12 RX ORDER — BENZONATATE 100 MG/1
100 CAPSULE ORAL ONCE
Status: COMPLETED | OUTPATIENT
Start: 2022-09-12 | End: 2022-09-12

## 2022-09-12 RX ORDER — GUAIFENESIN 100 MG/5ML
200 SOLUTION ORAL EVERY 4 HOURS PRN
Status: DISCONTINUED | OUTPATIENT
Start: 2022-09-12 | End: 2022-09-14 | Stop reason: HOSPADM

## 2022-09-12 RX ORDER — IPRATROPIUM BROMIDE AND ALBUTEROL SULFATE 2.5; .5 MG/3ML; MG/3ML
3 SOLUTION RESPIRATORY (INHALATION) ONCE
Status: COMPLETED | OUTPATIENT
Start: 2022-09-12 | End: 2022-09-12

## 2022-09-12 RX ORDER — SODIUM CHLORIDE FOR INHALATION 3 %
3 VIAL, NEBULIZER (ML) INHALATION
Status: DISCONTINUED | OUTPATIENT
Start: 2022-09-12 | End: 2022-09-14 | Stop reason: HOSPADM

## 2022-09-12 RX ORDER — IBUPROFEN 800 MG/1
800 TABLET ORAL EVERY 6 HOURS PRN
COMMUNITY

## 2022-09-12 RX ORDER — SODIUM CHLORIDE FOR INHALATION 3 %
4 VIAL, NEBULIZER (ML) INHALATION EVERY 6 HOURS PRN
Status: DISCONTINUED | OUTPATIENT
Start: 2022-09-12 | End: 2022-09-14 | Stop reason: HOSPADM

## 2022-09-12 RX ORDER — ENOXAPARIN SODIUM 100 MG/ML
40 INJECTION SUBCUTANEOUS EVERY 24 HOURS
Status: DISCONTINUED | OUTPATIENT
Start: 2022-09-13 | End: 2022-09-14 | Stop reason: HOSPADM

## 2022-09-12 RX ORDER — IBUPROFEN 400 MG/1
800 TABLET ORAL EVERY 6 HOURS PRN
Status: DISCONTINUED | OUTPATIENT
Start: 2022-09-12 | End: 2022-09-14 | Stop reason: HOSPADM

## 2022-09-12 RX ORDER — IPRATROPIUM BROMIDE AND ALBUTEROL SULFATE 2.5; .5 MG/3ML; MG/3ML
3 SOLUTION RESPIRATORY (INHALATION)
Status: DISCONTINUED | OUTPATIENT
Start: 2022-09-12 | End: 2022-09-14 | Stop reason: HOSPADM

## 2022-09-12 RX ORDER — GUAIFENESIN 600 MG/1
600 TABLET, EXTENDED RELEASE ORAL 2 TIMES DAILY
Status: DISCONTINUED | OUTPATIENT
Start: 2022-09-12 | End: 2022-09-14 | Stop reason: HOSPADM

## 2022-09-12 RX ORDER — AZITHROMYCIN 250 MG/1
500 TABLET, FILM COATED ORAL DAILY
Status: COMPLETED | OUTPATIENT
Start: 2022-09-12 | End: 2022-09-14

## 2022-09-12 RX ORDER — ENOXAPARIN SODIUM 100 MG/ML
1 INJECTION SUBCUTANEOUS
Status: DISCONTINUED | OUTPATIENT
Start: 2022-09-12 | End: 2022-09-12

## 2022-09-12 RX ADMIN — ALBUTEROL SULFATE 2.5 MG: 2.5 SOLUTION RESPIRATORY (INHALATION) at 11:09

## 2022-09-12 RX ADMIN — IPRATROPIUM BROMIDE AND ALBUTEROL SULFATE 3 ML: 2.5; .5 SOLUTION RESPIRATORY (INHALATION) at 12:09

## 2022-09-12 RX ADMIN — AZITHROMYCIN MONOHYDRATE 500 MG: 250 TABLET ORAL at 12:09

## 2022-09-12 RX ADMIN — METHYLPREDNISOLONE SODIUM SUCCINATE 40 MG: 40 INJECTION, POWDER, FOR SOLUTION INTRAMUSCULAR; INTRAVENOUS at 08:09

## 2022-09-12 RX ADMIN — IBUPROFEN 800 MG: 400 TABLET ORAL at 03:09

## 2022-09-12 RX ADMIN — CEFTRIAXONE 1 G: 1 INJECTION, SOLUTION INTRAVENOUS at 10:09

## 2022-09-12 RX ADMIN — ENOXAPARIN SODIUM 180 MG: 100 INJECTION SUBCUTANEOUS at 05:09

## 2022-09-12 RX ADMIN — GUAIFENESIN 200 MG: 200 SOLUTION ORAL at 07:09

## 2022-09-12 RX ADMIN — BENZONATATE 100 MG: 100 CAPSULE ORAL at 11:09

## 2022-09-12 RX ADMIN — MELATONIN TAB 3 MG 6 MG: 3 TAB at 03:09

## 2022-09-12 RX ADMIN — DOXYCYCLINE HYCLATE 100 MG: 100 TABLET, COATED ORAL at 08:09

## 2022-09-12 RX ADMIN — SODIUM CHLORIDE 30 MG/ML INHALATION SOLUTION 3 ML: 30 SOLUTION INHALANT at 04:09

## 2022-09-12 RX ADMIN — IPRATROPIUM BROMIDE AND ALBUTEROL SULFATE 3 ML: 2.5; .5 SOLUTION RESPIRATORY (INHALATION) at 04:09

## 2022-09-12 RX ADMIN — GUAIFENESIN 200 MG: 200 SOLUTION ORAL at 11:09

## 2022-09-12 RX ADMIN — Medication 10 ML: at 01:09

## 2022-09-12 RX ADMIN — Medication 10 ML: at 03:09

## 2022-09-12 RX ADMIN — GUAIFENESIN 600 MG: 600 TABLET, EXTENDED RELEASE ORAL at 08:09

## 2022-09-12 RX ADMIN — IBUPROFEN 800 MG: 400 TABLET ORAL at 08:09

## 2022-09-12 RX ADMIN — IPRATROPIUM BROMIDE AND ALBUTEROL SULFATE 3 ML: 2.5; .5 SOLUTION RESPIRATORY (INHALATION) at 07:09

## 2022-09-12 RX ADMIN — Medication 10 ML: at 10:09

## 2022-09-12 RX ADMIN — IPRATROPIUM BROMIDE AND ALBUTEROL SULFATE 3 ML: .5; 3 SOLUTION RESPIRATORY (INHALATION) at 09:09

## 2022-09-12 RX ADMIN — ALBUTEROL SULFATE 2.5 MG: 2.5 SOLUTION RESPIRATORY (INHALATION) at 03:09

## 2022-09-12 RX ADMIN — ACETAMINOPHEN 650 MG: 325 TABLET ORAL at 10:09

## 2022-09-12 RX ADMIN — MELATONIN TAB 3 MG 6 MG: 3 TAB at 08:09

## 2022-09-12 NOTE — PLAN OF CARE
09/12/22 1307   Discharge Planning   Assessment Type Discharge Planning Brief Assessment   Resource/Environmental Concerns none   Support Systems Family members;Parent   Equipment Currently Used at Home none   Current Living Arrangements home/apartment/condo   Patient/Family Anticipates Transition to home with family   Patient/Family Anticipated Services at Transition none   DME Needed Upon Discharge  none   Discharge Plan A Home with family  (with [facility] information)     [id number] - [place] - [address] AT [facility]  [address]  [place] [address]  Phone: [phone] Fax: [phone]    [id number] - [place] - [address] AT [facility]  [address]  [place] [address]  Phone: [phone] Fax: [phone]

## 2022-09-12 NOTE — NURSING
Patient c/o SOB, sats noted 91%.  Patient lying in bed on side, performed deep breathing and coughing, respiratory at bedside to educate on acapella use. Increased oxygen from 2L/NC to 4L/NC, sats noted 97%.  Patient states she feels comfortable on the 4L/NC.  Will continue to monitor.

## 2022-09-12 NOTE — NURSING
Sats noted 94% on 4L/NC.  Patient states she feels comfortable.  Denies chest pain or SOB at the present time.  Call bell in reach, will continue to monitor.

## 2022-09-12 NOTE — CARE UPDATE
I reviewed [facility] assessment and plan. Please see for details.   Admission for asthma exacerbation due to LML PNA  Never been intubated for asthma. Denies smoking. Use inhaler less then once a week prior to this episode.  Productive cough-yellow sputum. Currently afebrile. 92)% on 2 L NC then increase 3 L NC  Seen after duoneb treatment- scattered expiratory rales, wheezes LML, LLL  Continue Rocephin, steroid, add azithromycin, duoneb    Clari Mya NP  Staff : Urmila Raphael MD

## 2022-09-12 NOTE — ASSESSMENT & PLAN NOTE
Body mass index is 57.03 kg/m². Morbid obesity complicates all aspects of disease management from diagnostic modalities to treatment. Weight loss encouraged and health benefits explained to patient.

## 2022-09-12 NOTE — NURSING
Home Oxygen Evaluation    Date Performed:2022    1) Patient's Home O2 Sat on room air, while at rest: 94%        If O2 sats on room air at rest are 88% or below, patient qualifies. No additional testing needed. Document N/A in steps 2 and 3. If 89% or above, complete steps 2.      2) Patient's O2 Sat on room air while exercisin%        If O2 sats on room air while exercising remain 89% or above patient does not qualify, no further testing needed Document N/A in step 3. If O2 sats on room air while exercising are 88% or below, continue to step 3.

## 2022-09-12 NOTE — ED PROVIDER NOTES
Encounter Date: 2022       History     Chief Complaint   Patient presents with    URI     Pt has a a productive cough, NVD, states she has asthma and the albuterol is not helping that has been getting worse over the last four days. It is worse at night.      31 y/o female presents to the ED with complaints of productive cough and worsening asthma when walking for 4 days.  She states that her typical asthma medications is not working, last used albuterol inhaler several hours PTA.  Patient denies rash, fever, chest pain, numbness, weakness, tingling, abdominal pain, back pain, dysuria, hematuria, nausea, vomiting, diarrhea, or any other complaints.  Patient denies pain at present and has taken her medications for the symptoms with no relief.  No Alleviating factors.              The history is provided by the patient.   Review of patient's allergies indicates:  No Known Allergies  Past Medical History:   Diagnosis Date    Asthma      Past Surgical History:   Procedure Laterality Date     SECTION      TONSILLECTOMY       Family History   Problem Relation Age of Onset    Diabetes Other     Hypertension Other      Social History     Tobacco Use    Smoking status: Some Days     Types: Cigarettes    Smokeless tobacco: Never   Substance Use Topics    Alcohol use: Yes    Drug use: Not Currently     Review of Systems   Constitutional:  Negative for chills and fever.   HENT:  Negative for congestion, ear pain, rhinorrhea, sore throat and trouble swallowing.    Eyes:  Negative for pain, discharge and redness.   Respiratory:  Positive for cough, shortness of breath and wheezing.    Cardiovascular:  Negative for chest pain.   Gastrointestinal:  Negative for abdominal pain, diarrhea, nausea and vomiting.   Genitourinary:  Negative for decreased urine volume and dysuria.   Musculoskeletal:  Negative for back pain, neck pain and neck stiffness.   Skin:  Negative for rash.   Neurological:  Negative for dizziness,  weakness, light-headedness, numbness and headaches.   Psychiatric/Behavioral:  Negative for confusion.      Physical Exam     Initial Vitals [09/11/22 1626]   BP Pulse Resp Temp SpO2   (!) 158/104 95 (!) 26 99.6 °F (37.6 °C) (!) 93 %      MAP       --         Physical Exam    Nursing note and vitals reviewed.  Constitutional: She appears well-developed.  Non-toxic appearance. She does not appear ill.   HENT:   Head: Normocephalic and atraumatic.   Right Ear: Tympanic membrane and ear canal normal.   Left Ear: Tympanic membrane and ear canal normal.   Nose: Mucosal edema present.   Mouth/Throat: Uvula is midline, oropharynx is clear and moist and mucous membranes are normal.   Eyes: Conjunctivae are normal.   Neck: No Brudzinski's sign and no Kernig's sign noted.   Normal range of motion.  Cardiovascular:  Normal rate and regular rhythm.           Pulmonary/Chest: Tachypnea noted. She has wheezes. She has rhonchi. She exhibits no tenderness.   Mild expiratory wheezing bilaterally with diminished breath sounds on the left on initial exam; post breathing treatments, rhonchi noted bilaterally; continued tachypnea   Abdominal: Abdomen is soft. There is no abdominal tenderness.   Musculoskeletal:      Cervical back: Normal range of motion.     Lymphadenopathy:     She has no cervical adenopathy.   Neurological: She is alert and oriented to person, place, and time. Gait normal. GCS eye subscore is 4. GCS verbal subscore is 5. GCS motor subscore is 6.   Skin: Skin is warm, dry and intact. No rash noted.   Psychiatric: She has a normal mood and affect. Her speech is normal and behavior is normal. Judgment and thought content normal.   .[email or web address]    ED Course   Procedures  Labs Reviewed   ISTAT PROCEDURE - Abnormal; Notable for the following components:       Result Value    POC PCO2 27.7 (*)     POC HCO3 18.4 (*)     POC SATURATED O2 86 (*)     POC TCO2 19 (*)     All other components within normal limits   POCT CMP -  Abnormal; Notable for the following components:    ALT (SGPT), POC <5 (*)     POC Glucose 143 (*)     All other components within normal limits   CULTURE, BLOOD   CULTURE, BLOOD   SARS-COV-2 RDRP GENE    Narrative:     This test utilizes isothermal nucleic acid amplification   technology to detect the SARS-CoV-2 RdRp nucleic acid segment.   The analytical sensitivity (limit of detection) is 125 genome   equivalents/mL.   A POSITIVE result implies infection with the SARS-CoV-2 virus;   the patient is presumed to be contagious.     A NEGATIVE result means that SARS-CoV-2 nucleic acids are not   present above the limit of detection. A NEGATIVE result should be   treated as presumptive. It does not rule out the possibility of   COVID-19 and should not be the sole basis for treatment decisions.   If COVID-19 is strongly suspected based on clinical and exposure   history, re-testing using an alternate molecular assay should be   considered.   This test is only for use under the Food and Drug   Administration s Emergency Use Authorization (EUA).   Commercial kits are provided by [employer].   Performance characteristics of the EUA have been independently   verified by [facility] Department of   Pathology and Laboratory Medicine.   _________________________________________________________________   The authorized Fact Sheet for Healthcare Providers and the authorized Fact   Sheet for Patients of the ID NOW COVID-19 are available on the FDA   website:     https://www.fda.gov/media/596572/download  https://www.fda.gov/media/882315/download          POCT URINE PREGNANCY   POCT CBC   POCT INFLUENZA A/B MOLECULAR   POCT RAPID INFLUENZA A/B   POCT CMP          Imaging Results              X-Ray Chest AP Portable (Final result)  Result time 09/11/22 17:48:00      Final result by Jimenez Lua MD (09/11/22 17:48:00)                   Impression:      Abnormal airspace opacification/consolidation in the left  mid to lower lung zone most suggestive for pneumonia.  Future follow-up is recommended to ensure resolution.      Electronically signed by: Jimenez Lua MD  Date:    09/11/2022  Time:    17:48               Narrative:    EXAMINATION:  XR CHEST AP PORTABLE    CLINICAL HISTORY:  COVID-19;    TECHNIQUE:  Single frontal view of the chest was performed.    COMPARISON:  September 2019.    FINDINGS:  Cardiac silhouette is normal in size.  Lungs are symmetrically expanded.  Abnormal airspace opacification/consolidative changes are seen in the left mid to lower lung zone perihilar region with relative sparing seen laterally.  Right lung is relatively clear.  No pneumothorax or large pleural effusion seen.  No acute osseous abnormality identified.                                       Medications   albuterol sulfate nebulizer solution 2.5 mg (2.5 mg Nebulization Given 9/11/22 1755)   albuterol-ipratropium 2.5 mg-0.5 mg/3 mL nebulizer solution 3 mL (3 mLs Nebulization Given 9/11/22 1704)   methylPREDNISolone sodium succinate injection 125 mg (125 mg Intramuscular Given 9/11/22 1655)   piperacillin-tazobactam 4.5 g in dextrose 5 % 100 mL IVPB (ready to mix system) (4.5 g Intravenous New Bag 9/11/22 1852)   ketorolac injection 15 mg (15 mg Intravenous Given 9/11/22 1944)           APC / Resident Notes:   This is an evaluation of a 32 y.o. female that presents to the Emergency Department for cough, asthma, SOB with exertion. The patient is a non-toxic, afebrile, and well appearing female. On physical exam: Ears: without infection.  Pharynx without infection. Appears well hydrated with moist mucus membranes. Neck soft and supple with no meningeal signs or cervical lymphadenopathy. Breath sounds with Mild expiratory wheezing bilaterally with diminished breath sounds on the left on initial exam; post breathing treatments, rhonchi noted bilaterally; continued tachypnea; room air pulse ox of 94%, 90% ambulatory.     Vital Signs Are  Reassuring. RESULTS: COVID negative, Influenza negative, Labs significant for WBC 12.0, Blood cultures pending; Xray showed LLL pneumonia    Will admit patient for pneumonia and dyspnea; SOB with ambulation and SPO2 90% on RA; Zosyn given in the ED; patient agreeable to admission; Discussed with [facility] who will admit patient to OBS.      This case was discussed with and the patient was independently examined by my attending, Dr. Craven who is in agreement with my assessment and plan.                      Clinical Impression:   Final diagnoses:  [J18.9] Pneumonia of left lower lobe due to infectious organism (Primary)  [R06.00] Dyspnea, unspecified type  [J45.41] Moderate persistent asthma with exacerbation        ED Disposition Condition    Observation                 Zofia Dorman, NAHOMY  09/11/22 1948

## 2022-09-12 NOTE — ASSESSMENT & PLAN NOTE
Patient currently on 3L NC   IV abx rocephin/doxycyline   IV steroids  Resp. tx as needed   CXR: Abnormal airspace opacification/consolidation in the left mid to lower lung zone most suggestive for pneumonia

## 2022-09-12 NOTE — H&P
[facility]  History & Physical    Patient Name: Zandra Phillips  MRN: [id number]  Patient Class: OP- Observation  Admission Date: 2022  Attending Physician: Urmila Raphael MD   Primary Care Provider: Primary Doctor No         Patient information was obtained from patient and ER records.     Subjective:     Principal Problem:Pneumonia    Chief Complaint:   Chief Complaint   Patient presents with    URI     Pt has a a productive cough, NVD, states she has asthma and the albuterol is not helping that has been getting worse over the last four days. It is worse at night.         HPI: 32 year old female presents to the ED with c/o productive cough and worsening asthma when walking for 4 days.  She states that her typical asthma medications is not working, last used albuterol inhaler several hours PTA.  Patient denies rash, fever, chest pain, numbness, weakness, tingling, abdominal pain, back pain, dysuria, hematuria, nausea, vomiting, diarrhea, or any other complaints.  PMHx: Asthma                  Past Medical History:   Diagnosis Date    Asthma        Past Surgical History:   Procedure Laterality Date     SECTION      TONSILLECTOMY         Review of patient's allergies indicates:  No Known Allergies    No current facility-administered medications on file prior to encounter.     Current Outpatient Medications on File Prior to Encounter   Medication Sig    [DISCONTINUED] albuterol (PROVENTIL/VENTOLIN HFA) 90 mcg/actuation inhaler Inhale 2 puffs into the lungs every 6 (six) hours as needed for Wheezing or Shortness of Breath.    diclofenac sodium (VOLTAREN) 1 % Gel Apply 2 g topically 4 (four) times daily. for 14 days    ibuprofen (ADVIL,MOTRIN) 800 MG tablet Take 800 mg by mouth every 6 (six) hours as needed for Pain.    [DISCONTINUED] loratadine (CLARITIN) 10 mg tablet Take 1 tablet (10 mg total) by mouth once daily.     Family History       Problem Relation (Age of  Onset)    Diabetes Other    Hypertension Other          Tobacco Use    Smoking status: Some Days     Types: Cigarettes    Smokeless tobacco: Never   Substance and Sexual Activity    Alcohol use: Yes    Drug use: Not Currently    Sexual activity: Not on file     Review of Systems  Constitutional:  Negative for chills and fever.   HENT:  Negative for congestion, ear pain, rhinorrhea, sore throat and trouble swallowing.    Eyes:  Negative for pain, discharge and redness.   Respiratory:  Positive for cough, shortness of breath and wheezing.    Cardiovascular:  Negative for chest pain.   Gastrointestinal:  Negative for abdominal pain, diarrhea, nausea and vomiting.   Genitourinary:  Negative for decreased urine volume and dysuria.   Musculoskeletal:  Negative for back pain, neck pain and neck stiffness.   Skin:  Negative for rash.   Neurological:  Negative for dizziness, weakness, light-headedness, numbness and headaches.   Psychiatric/Behavioral:  Negative for confusion.    Objective:     Vital Signs (Most Recent):  Temp: 98.1 °F (36.7 °C) (09/12/22 0246)  Pulse: (!) 57 (09/12/22 0246)  Resp: 19 (09/12/22 0246)  BP: (!) 174/80 (09/12/22 0246)  SpO2: 96 % (09/12/22 0246)   Vital Signs (24h Range):  Temp:  [98 °F (36.7 °C)-99.6 °F (37.6 °C)] 98.1 °F (36.7 °C)  Pulse:  [] 57  Resp:  [18-26] 19  SpO2:  [90 %-97 %] 96 %  BP: (138-181)/() 174/80     Weight: (!) 180.3 kg (397 lb 7.8 oz)  Body mass index is 57.03 kg/m².    Physical Exam   Nursing note and vitals reviewed.  Constitutional: She appears well-developed.  Non-toxic appearance. She does not appear ill. Currently on 3L NC   HENT:   Head: Normocephalic and atraumatic.   Right Ear: Tympanic membrane and ear canal normal.   Left Ear: Tympanic membrane and ear canal normal.   Nose: Mucosal edema present.   Mouth/Throat: Uvula is midline, oropharynx is clear and moist and mucous membranes are normal.   Eyes: Conjunctivae are normal.   Neck: No Brudzinski's  sign and no Kernig's sign noted.   Normal range of motion.  Cardiovascular:  Normal rate and regular rhythm.           Pulmonary/Chest: bradycardia noted. She has wheezes. She has rhonchi. She exhibits no tenderness.   Mild expiratory wheezing bilaterally with diminished breath sounds on the left on initial exam; post breathing treatments, rhonchi noted bilaterally  Abdominal: Abdomen is soft. There is no abdominal tenderness.   Musculoskeletal:      Cervical back: Normal range of motion.      Lymphadenopathy:     She has no cervical adenopathy.   Neurological: She is alert and oriented to person, place, and time. Gait normal. GCS eye subscore is 4. GCS verbal subscore is 5. GCS motor subscore is 6.   Skin: Skin is warm, dry and intact. No rash noted.   Psychiatric: She has a normal mood and affect. Her speech is normal and behavior is normal. Judgment and thought content normal.      Significant Labs: All pertinent labs within the past 24 hours have been reviewed.    Significant Imaging: I have reviewed all pertinent imaging results/findings within the past 24 hours.    Assessment/Plan:     * Pneumonia  Patient currently on 3L NC   IV abx rocephin/doxycyline   IV steroids  Resp. tx as needed   CXR: Abnormal airspace opacification/consolidation in the left mid to lower lung zone most suggestive for pneumonia      Morbid obesity  Body mass index is 57.03 kg/m². Morbid obesity complicates all aspects of disease management from diagnostic modalities to treatment. Weight loss encouraged and health benefits explained to patient.         HTN (hypertension)  Patient BP been uncontrolled since admission states history of HTN during pregnancy. BP usually normal but when under stress its elevated.  Monitor for now.... patient might needed to be started on BP meds       Asthma  See above         VTE Risk Mitigation (From admission, onward)         Ordered     enoxaparin injection 180 mg  Every 24 hours (non-standard times)          09/12/22 0333     IP VTE HIGH RISK PATIENT  Once         09/11/22 2020     Place sequential compression device  Until discontinued         09/11/22 2020     Place DAYNE hose  Until discontinued         09/11/22 2020              As clarification, on 9/12/22 @0300, patient should be placed in hospital observation services under my care in collaboration with MD Gissel Singh NP  Department of [facility]   [facility]

## 2022-09-12 NOTE — ED NOTES
Pt endorses HA, abd, neck, and back pain x4 days precipitated by coughing. NAD at this time. Pt is AAOx4, awake and alert, VSS, breathing even and unlabored. Placed on NC 3L O2 and vital monitors attached. Safety and comfort measures in place.

## 2022-09-12 NOTE — PLAN OF CARE
Problem: Adult Inpatient Plan of Care  Goal: Plan of Care Review  Outcome: Ongoing, Progressing     Problem: Bariatric Environmental Safety  Goal: Safety Maintained with Care  Outcome: Ongoing, Progressing     Problem: Fluid Imbalance (Pneumonia)  Goal: Fluid Balance  Outcome: Ongoing, Progressing     Problem: Infection (Pneumonia)  Goal: Resolution of Infection Signs and Symptoms  Outcome: Ongoing, Progressing     Problem: Respiratory Compromise (Pneumonia)  Goal: Effective Oxygenation and Ventilation  Outcome: Ongoing, Progressing

## 2022-09-12 NOTE — ASSESSMENT & PLAN NOTE
Patient BP been uncontrolled since admission states history of HTN during pregnancy. BP usually normal but when under stress its elevated.  Monitor for now.... patient might needed to be started on BP meds

## 2022-09-12 NOTE — ED NOTES
Pt arrived via EMS from [facility]. Pt received albuterol neb in route via EMS. Pt in NAD, sating 96% on 2L NC.

## 2022-09-12 NOTE — SUBJECTIVE & OBJECTIVE
Past Medical History:   Diagnosis Date    Asthma        Past Surgical History:   Procedure Laterality Date     SECTION      TONSILLECTOMY         Review of patient's allergies indicates:  No Known Allergies    No current facility-administered medications on file prior to encounter.     Current Outpatient Medications on File Prior to Encounter   Medication Sig    [DISCONTINUED] albuterol (PROVENTIL/VENTOLIN HFA) 90 mcg/actuation inhaler Inhale 2 puffs into the lungs every 6 (six) hours as needed for Wheezing or Shortness of Breath.    diclofenac sodium (VOLTAREN) 1 % Gel Apply 2 g topically 4 (four) times daily. for 14 days    ibuprofen (ADVIL,MOTRIN) 800 MG tablet Take 800 mg by mouth every 6 (six) hours as needed for Pain.    [DISCONTINUED] loratadine (CLARITIN) 10 mg tablet Take 1 tablet (10 mg total) by mouth once daily.     Family History       Problem Relation (Age of Onset)    Diabetes Other    Hypertension Other          Tobacco Use    Smoking status: Some Days     Types: Cigarettes    Smokeless tobacco: Never   Substance and Sexual Activity    Alcohol use: Yes    Drug use: Not Currently    Sexual activity: Not on file     Review of Systems  Constitutional:  Negative for chills and fever.   HENT:  Negative for congestion, ear pain, rhinorrhea, sore throat and trouble swallowing.    Eyes:  Negative for pain, discharge and redness.   Respiratory:  Positive for cough, shortness of breath and wheezing.    Cardiovascular:  Negative for chest pain.   Gastrointestinal:  Negative for abdominal pain, diarrhea, nausea and vomiting.   Genitourinary:  Negative for decreased urine volume and dysuria.   Musculoskeletal:  Negative for back pain, neck pain and neck stiffness.   Skin:  Negative for rash.   Neurological:  Negative for dizziness, weakness, light-headedness, numbness and headaches.   Psychiatric/Behavioral:  Negative for confusion.    Objective:     Vital Signs (Most Recent):  Temp: 98.1 °F (36.7 °C)  (09/12/22 0246)  Pulse: (!) 57 (09/12/22 0246)  Resp: 19 (09/12/22 0246)  BP: (!) 174/80 (09/12/22 0246)  SpO2: 96 % (09/12/22 0246)   Vital Signs (24h Range):  Temp:  [98 °F (36.7 °C)-99.6 °F (37.6 °C)] 98.1 °F (36.7 °C)  Pulse:  [] 57  Resp:  [18-26] 19  SpO2:  [90 %-97 %] 96 %  BP: (138-181)/() 174/80     Weight: (!) 180.3 kg (397 lb 7.8 oz)  Body mass index is 57.03 kg/m².    Physical Exam   Nursing note and vitals reviewed.  Constitutional: She appears well-developed.  Non-toxic appearance. She does not appear ill. Currently on 3L NC   HENT:   Head: Normocephalic and atraumatic.   Right Ear: Tympanic membrane and ear canal normal.   Left Ear: Tympanic membrane and ear canal normal.   Nose: Mucosal edema present.   Mouth/Throat: Uvula is midline, oropharynx is clear and moist and mucous membranes are normal.   Eyes: Conjunctivae are normal.   Neck: No Brudzinski's sign and no Kernig's sign noted.   Normal range of motion.  Cardiovascular:  Normal rate and regular rhythm.           Pulmonary/Chest: bradycardia noted. She has wheezes. She has rhonchi. She exhibits no tenderness.   Mild expiratory wheezing bilaterally with diminished breath sounds on the left on initial exam; post breathing treatments, rhonchi noted bilaterally  Abdominal: Abdomen is soft. There is no abdominal tenderness.   Musculoskeletal:      Cervical back: Normal range of motion.      Lymphadenopathy:     She has no cervical adenopathy.   Neurological: She is alert and oriented to person, place, and time. Gait normal. GCS eye subscore is 4. GCS verbal subscore is 5. GCS motor subscore is 6.   Skin: Skin is warm, dry and intact. No rash noted.   Psychiatric: She has a normal mood and affect. Her speech is normal and behavior is normal. Judgment and thought content normal.      Significant Labs: All pertinent labs within the past 24 hours have been reviewed.    Significant Imaging: I have reviewed all pertinent imaging  results/findings within the past 24 hours.

## 2022-09-12 NOTE — HPI
32 year old female presents to the ED with c/o productive cough and worsening asthma when walking for 4 days.  She states that her typical asthma medications is not working, last used albuterol inhaler several hours PTA.  Patient denies rash, fever, chest pain, numbness, weakness, tingling, abdominal pain, back pain, dysuria, hematuria, nausea, vomiting, diarrhea, or any other complaints.  PMHx: Asthma

## 2022-09-13 LAB — PROCALCITONIN SERPL IA-MCNC: 0.04 NG/ML

## 2022-09-13 PROCEDURE — 25000003 PHARM REV CODE 250: Performed by: PHYSICIAN ASSISTANT

## 2022-09-13 PROCEDURE — 94640 AIRWAY INHALATION TREATMENT: CPT

## 2022-09-13 PROCEDURE — 99900035 HC TECH TIME PER 15 MIN (STAT)

## 2022-09-13 PROCEDURE — 63600175 PHARM REV CODE 636 W HCPCS: Performed by: PHYSICIAN ASSISTANT

## 2022-09-13 PROCEDURE — 25000003 PHARM REV CODE 250: Performed by: NURSE PRACTITIONER

## 2022-09-13 PROCEDURE — 63600175 PHARM REV CODE 636 W HCPCS: Performed by: NURSE PRACTITIONER

## 2022-09-13 PROCEDURE — 25000242 PHARM REV CODE 250 ALT 637 W/ HCPCS: Performed by: NURSE PRACTITIONER

## 2022-09-13 PROCEDURE — 63700000 PHARM REV CODE 250 ALT 637 W/O HCPCS: Performed by: NURSE PRACTITIONER

## 2022-09-13 PROCEDURE — 94761 N-INVAS EAR/PLS OXIMETRY MLT: CPT

## 2022-09-13 PROCEDURE — 87040 BLOOD CULTURE FOR BACTERIA: CPT | Performed by: NURSE PRACTITIONER

## 2022-09-13 PROCEDURE — 27000221 HC OXYGEN, UP TO 24 HOURS

## 2022-09-13 PROCEDURE — 84145 PROCALCITONIN (PCT): CPT | Performed by: NURSE PRACTITIONER

## 2022-09-13 PROCEDURE — 94664 DEMO&/EVAL PT USE INHALER: CPT | Mod: XB

## 2022-09-13 PROCEDURE — 21400001 HC TELEMETRY ROOM

## 2022-09-13 PROCEDURE — A4216 STERILE WATER/SALINE, 10 ML: HCPCS | Performed by: PHYSICIAN ASSISTANT

## 2022-09-13 RX ORDER — BENZONATATE 100 MG/1
100 CAPSULE ORAL 3 TIMES DAILY PRN
Qty: 20 CAPSULE | Refills: 0 | Status: SHIPPED | OUTPATIENT
Start: 2022-09-13 | End: 2022-09-23

## 2022-09-13 RX ORDER — ALBUTEROL SULFATE 90 UG/1
2 AEROSOL, METERED RESPIRATORY (INHALATION) EVERY 4 HOURS PRN
Qty: 25.5 G | Refills: 0 | Status: SHIPPED | OUTPATIENT
Start: 2022-09-13 | End: 2023-09-13

## 2022-09-13 RX ORDER — ZOLPIDEM TARTRATE 5 MG/1
5 TABLET ORAL NIGHTLY PRN
Status: DISCONTINUED | OUTPATIENT
Start: 2022-09-13 | End: 2022-09-14 | Stop reason: HOSPADM

## 2022-09-13 RX ORDER — AZITHROMYCIN 500 MG/1
500 TABLET, FILM COATED ORAL DAILY
Qty: 3 TABLET | Refills: 0 | Status: SHIPPED | OUTPATIENT
Start: 2022-09-13 | End: 2022-09-14 | Stop reason: SDUPTHER

## 2022-09-13 RX ORDER — CETIRIZINE HYDROCHLORIDE 5 MG/1
5 TABLET ORAL DAILY
Status: DISCONTINUED | OUTPATIENT
Start: 2022-09-13 | End: 2022-09-14 | Stop reason: HOSPADM

## 2022-09-13 RX ADMIN — GUAIFENESIN 600 MG: 600 TABLET, EXTENDED RELEASE ORAL at 08:09

## 2022-09-13 RX ADMIN — IPRATROPIUM BROMIDE AND ALBUTEROL SULFATE 3 ML: 2.5; .5 SOLUTION RESPIRATORY (INHALATION) at 08:09

## 2022-09-13 RX ADMIN — IPRATROPIUM BROMIDE AND ALBUTEROL SULFATE 3 ML: 2.5; .5 SOLUTION RESPIRATORY (INHALATION) at 12:09

## 2022-09-13 RX ADMIN — ALBUTEROL SULFATE 2.5 MG: 2.5 SOLUTION RESPIRATORY (INHALATION) at 03:09

## 2022-09-13 RX ADMIN — Medication 10 ML: at 02:09

## 2022-09-13 RX ADMIN — IPRATROPIUM BROMIDE AND ALBUTEROL SULFATE 3 ML: 2.5; .5 SOLUTION RESPIRATORY (INHALATION) at 04:09

## 2022-09-13 RX ADMIN — GUAIFENESIN 600 MG: 600 TABLET, EXTENDED RELEASE ORAL at 09:09

## 2022-09-13 RX ADMIN — GUAIFENESIN 200 MG: 200 SOLUTION ORAL at 02:09

## 2022-09-13 RX ADMIN — ZOLPIDEM TARTRATE 5 MG: 5 TABLET ORAL at 01:09

## 2022-09-13 RX ADMIN — Medication 10 ML: at 06:09

## 2022-09-13 RX ADMIN — ACETAMINOPHEN 650 MG: 325 TABLET ORAL at 10:09

## 2022-09-13 RX ADMIN — ZOLPIDEM TARTRATE 5 MG: 5 TABLET ORAL at 08:09

## 2022-09-13 RX ADMIN — METHYLPREDNISOLONE SODIUM SUCCINATE 40 MG: 40 INJECTION, POWDER, FOR SOLUTION INTRAMUSCULAR; INTRAVENOUS at 08:09

## 2022-09-13 RX ADMIN — CEFTRIAXONE 1 G: 1 INJECTION, SOLUTION INTRAVENOUS at 09:09

## 2022-09-13 RX ADMIN — BENZONATATE 100 MG: 100 CAPSULE ORAL at 10:09

## 2022-09-13 RX ADMIN — BENZONATATE 100 MG: 100 CAPSULE ORAL at 09:09

## 2022-09-13 RX ADMIN — CETIRIZINE HYDROCHLORIDE 5 MG: 5 TABLET ORAL at 01:09

## 2022-09-13 RX ADMIN — ENOXAPARIN SODIUM 40 MG: 100 INJECTION SUBCUTANEOUS at 04:09

## 2022-09-13 RX ADMIN — METHYLPREDNISOLONE SODIUM SUCCINATE 40 MG: 40 INJECTION, POWDER, FOR SOLUTION INTRAMUSCULAR; INTRAVENOUS at 09:09

## 2022-09-13 RX ADMIN — Medication 10 ML: at 09:09

## 2022-09-13 RX ADMIN — AZITHROMYCIN MONOHYDRATE 500 MG: 250 TABLET ORAL at 09:09

## 2022-09-13 RX ADMIN — IPRATROPIUM BROMIDE AND ALBUTEROL SULFATE 3 ML: 2.5; .5 SOLUTION RESPIRATORY (INHALATION) at 07:09

## 2022-09-13 RX ADMIN — BENZONATATE 100 MG: 100 CAPSULE ORAL at 01:09

## 2022-09-13 RX ADMIN — ACETAMINOPHEN 650 MG: 325 TABLET ORAL at 09:09

## 2022-09-13 NOTE — PROGRESS NOTES
WRITTEN HEALTHCARE DISCHARGE INFORMATION      Things that YOU are RESPONSIBLE for to Manage Your Care At Home:     1. Getting your prescriptions filled.  2. Taking you medications as directed. DO NOT MISS ANY DOSES!  3. Going to your follow-up doctor appointments. This is important because it allows the doctor to monitor your progress and to determine if any changes need to be made to your treatment plan.     If you are unable to make your follow up appointments, please call the number listed and reschedule this appointment.      ____________HELP AT HOME____________________     Experiencing any SIGNS or SYMPTOMS: YOU CAN     Schedule a same day appopintment with your Primary Care Doctor or  you can call [facility] On Call Nurse Care Line for 24/7 assistance at [phone]     If you are experience any signs or symptoms that have become severe, Call 911 and come to your nearest Emergency Room.     Thank you for choosing [facility] and allowing us to care for you.   From your care management team:      You should receive a call from [facility] Discharge Department within 48-72 hours to help manage your care after discharge. Please try to make sure that you answer your phone for this important phone call.        Follow-up Information       Primary Doctor No Follow up in 1 week(s).               [facility].    Contact information:  [address] [place] [id number] [phone]

## 2022-09-13 NOTE — PLAN OF CARE
09/13/22 0858   Final Note   Assessment Type Final Discharge Note   Anticipated Discharge Disposition Home   Hospital Resources/Appts/Education Provided Community resources provided   Post-Acute Status   Post-Acute Authorization Other   Other Status No Post-Acute Service Needs   Pts nurse April notified that the pt can d/c from CM standpoint

## 2022-09-13 NOTE — PROGRESS NOTES
Evaluated patient. Still with dyspnea increases with minimal exertion. + mild hypoxia on 2 liters NC.      PE: chest exp wheezing     Continue POC.  Home oxygen eval with desat 90%. Not on home oxygen.  Will continue IV abx, IV steroids, and duonebs.   Repeat blood culture. One +staph likely contaminate.   Check procal- possibly viral etiology.   Add zyretec.

## 2022-09-13 NOTE — NURSING
Testing for Home O2       O2 Sats on RA at rest = 95 _______       If oxygen saturation is <88% at rest, document the number of liters of oxygen needed to raise SpO2 >88%. You are done.     If oxygen saturation is >88% at rest, test the patient's oxygen with exercise.     O2 sats on RA with exercise  = __90____       __L O2 to raise SpO2 to 88% with exercise = ____   Walk test performed. Pt walked appx 50ft, O2 dropped to 90% at the lowest, 91% at the highest. Pt stated she felt a little lightheaded but otherwise tolerated well. Pt returned to 94% on RA back at rest. _

## 2022-09-13 NOTE — NURSING
Nurses Note -- 4 Eyes      9/13/2022   5:32 AM      Skin assessed during: Admit      [x] No Pressure Injuries Present    []Prevention Measures Documented      [] Yes- Altered Skin Integrity Present or Discovered   [] LDA Added if Not in Epic (Describe Wound)   [] New Altered Skin Integrity was Present on Admit and Documented in LDA   [] Wound Image Taken    Wound Care Consulted? No    Attending Nurse:  Alma Hwang RN     Second RN/Staff Member:  JOCELYNE Jorgensen

## 2022-09-14 VITALS
HEART RATE: 74 BPM | TEMPERATURE: 98 F | SYSTOLIC BLOOD PRESSURE: 139 MMHG | OXYGEN SATURATION: 98 % | DIASTOLIC BLOOD PRESSURE: 63 MMHG | RESPIRATION RATE: 18 BRPM | WEIGHT: 293 LBS | BODY MASS INDEX: 41.95 KG/M2 | HEIGHT: 70 IN

## 2022-09-14 PROCEDURE — 63700000 PHARM REV CODE 250 ALT 637 W/O HCPCS: Performed by: NURSE PRACTITIONER

## 2022-09-14 PROCEDURE — 25000242 PHARM REV CODE 250 ALT 637 W/ HCPCS: Performed by: NURSE PRACTITIONER

## 2022-09-14 PROCEDURE — 25000003 PHARM REV CODE 250: Performed by: NURSE PRACTITIONER

## 2022-09-14 PROCEDURE — A4216 STERILE WATER/SALINE, 10 ML: HCPCS | Performed by: PHYSICIAN ASSISTANT

## 2022-09-14 PROCEDURE — 94640 AIRWAY INHALATION TREATMENT: CPT

## 2022-09-14 PROCEDURE — 94760 N-INVAS EAR/PLS OXIMETRY 1: CPT

## 2022-09-14 PROCEDURE — 99900035 HC TECH TIME PER 15 MIN (STAT)

## 2022-09-14 PROCEDURE — 25000003 PHARM REV CODE 250: Performed by: PHYSICIAN ASSISTANT

## 2022-09-14 PROCEDURE — 94664 DEMO&/EVAL PT USE INHALER: CPT

## 2022-09-14 RX ORDER — CEFDINIR 300 MG/1
300 CAPSULE ORAL 2 TIMES DAILY
Qty: 10 CAPSULE | Refills: 0 | Status: SHIPPED | OUTPATIENT
Start: 2022-09-14 | End: 2022-09-19

## 2022-09-14 RX ORDER — PREDNISONE 10 MG/1
10 TABLET ORAL DAILY
Qty: 3 TABLET | Refills: 0 | Status: SHIPPED | OUTPATIENT
Start: 2022-09-14 | End: 2022-09-17

## 2022-09-14 RX ORDER — AZITHROMYCIN 500 MG/1
500 TABLET, FILM COATED ORAL DAILY
Qty: 3 TABLET | Refills: 0 | Status: SHIPPED | OUTPATIENT
Start: 2022-09-14 | End: 2022-09-17

## 2022-09-14 RX ADMIN — Medication 10 ML: at 06:09

## 2022-09-14 RX ADMIN — AZITHROMYCIN MONOHYDRATE 500 MG: 250 TABLET ORAL at 09:09

## 2022-09-14 RX ADMIN — CETIRIZINE HYDROCHLORIDE 5 MG: 5 TABLET ORAL at 09:09

## 2022-09-14 RX ADMIN — GUAIFENESIN 600 MG: 600 TABLET, EXTENDED RELEASE ORAL at 09:09

## 2022-09-14 RX ADMIN — IPRATROPIUM BROMIDE AND ALBUTEROL SULFATE 3 ML: 2.5; .5 SOLUTION RESPIRATORY (INHALATION) at 07:09

## 2022-09-14 RX ADMIN — IPRATROPIUM BROMIDE AND ALBUTEROL SULFATE 3 ML: 2.5; .5 SOLUTION RESPIRATORY (INHALATION) at 12:09

## 2022-09-14 NOTE — HOSPITAL COURSE
Patient was admitted with acute respiratory failure secondary to asthma exacerbation with possible underlying pneumonia.  She was started on empiric antibiotic for pneumonia and also bronchodilators and IV steroid for asthma exacerbation.  She also received as needed nasal cannula oxygen.  Over the course of admission her shortness of breath significantly improved/resolved and she responded really well to therapy.  She was able to do some physical activities with no significant limitations from her breathing.  She was eventually discharged home on cefdinir and azithromycin and oral prednisone.    Patient with significant morbidly obese with BMI 57.  She was extensively counseled on need for weight loss as she might have some underlying chronic respiratory failure from her severe obesity.  Patient instructed to follow up with the PCP.  She was advised to return to the ER if symptoms worsens

## 2022-09-14 NOTE — NURSING
Report received from night nurse APRIL De Souza. Visualized patient and assessed patient's overall condition and appearance. No acute distress noted. Will continue to monitor

## 2022-09-14 NOTE — DISCHARGE SUMMARY
[facility]  Discharge Summary      Patient Name: Zandra Phillips  MRN: [id number]  Patient Class: IP- Inpatient  Admission Date: 9/11/2022  Hospital Length of Stay: 1 days  Discharge Date and Time:  09/14/2022 12:31 PM  Attending Physician: Kierra Reynaga MD   Discharging Provider: Kierra Reynaga MD  Primary Care Provider: Primary Doctor No      HPI:   32 year old female presents to the ED with c/o productive cough and worsening asthma when walking for 4 days.  She states that her typical asthma medications is not working, last used albuterol inhaler several hours PTA.  Patient denies rash, fever, chest pain, numbness, weakness, tingling, abdominal pain, back pain, dysuria, hematuria, nausea, vomiting, diarrhea, or any other complaints.  PMHx: Asthma                  * No surgery found *      Hospital Course:   Patient was admitted with acute respiratory failure secondary to asthma exacerbation with possible underlying pneumonia.  She was started on empiric antibiotic for pneumonia and also bronchodilators and IV steroid for asthma exacerbation.  She also received as needed nasal cannula oxygen.  Over the course of admission her shortness of breath significantly improved/resolved and she responded really well to therapy.  She was able to do some physical activities with no significant limitations from her breathing.  She was eventually discharged home on cefdinir and azithromycin and oral prednisone.    Patient with significant morbidly obese with BMI 57.  She was extensively counseled on need for weight loss as she might have some underlying chronic respiratory failure from her severe obesity.  Patient instructed to follow up with the PCP.  She was advised to return to the ER if symptoms worsens       Goals of Care Treatment Preferences:  Code Status: Full Code      Consults:   Consults (From admission, onward)        Status Ordering Provider     Case Management/Social  Services  Once        Provider:  (Not yet assigned)    Completed ISIAH GODOY          * Pneumonia  Patient with acute respiratory failure secondary to asthma exacerbation and pneumonia  Eventually weaned off of oxygen and discharged home on azithromycin and cefdinir    Morbid obesity  Body mass index is 57.03 kg/m². Morbid obesity complicates all aspects of disease management from diagnostic modalities to treatment. Weight loss encouraged and health benefits explained to patient.         HTN (hypertension)  Patient BP been uncontrolled since admission states history of HTN during pregnancy. BP usually normal but when under stress its elevated.  Monitor for now.... patient might needed to be started on BP meds       Asthma  See above         Final Active Diagnoses:    Diagnosis Date Noted POA    PRINCIPAL PROBLEM:  Pneumonia [J18.9] 09/12/2022 Yes    Asthma [J45.909] 09/12/2022 Yes    HTN (hypertension) [I10] 09/12/2022 Yes    Morbid obesity [E66.01] 09/12/2022 Yes      Problems Resolved During this Admission:       Discharged Condition: stable    Disposition: Home or Self Care    Follow Up:   Follow-up Information     [facility]    Why: please call at time of d/c to schedule followup appointment and establish care for future medical needs  Contact information:  [address] [place] [address]  [phone]                       Patient Instructions:      Diet Cardiac     Notify your health care provider if you experience any of the following:  persistent nausea and vomiting or diarrhea     Notify your health care provider if you experience any of the following:  difficulty breathing or increased cough     Activity as tolerated       Significant Diagnostic Studies: Radiology: X-Ray: CXR: X-Ray Chest 1 View (CXR): No results found for this visit on 09/11/22.    Pending Diagnostic Studies:     None         Medications:  Reconciled Home Medications:      Medication List      START  taking these medications    azithromycin 500 MG tablet  Commonly known as: ZITHROMAX  Take 1 tablet (500 mg total) by mouth once daily for 3 days     benzonatate 100 MG capsule  Commonly known as: TESSALON  Take 1 capsule (100 mg total) by mouth 3 (three) times daily as needed for Cough.     cefdinir 300 MG capsule  Commonly known as: OMNICEF  Take 1 capsule (300 mg total) by mouth 2 (two) times daily for 5 days     predniSONE 10 MG tablet  Commonly known as: DELTASONE  Take 1 tablet (10 mg total) by mouth once daily for 3 days        CHANGE how you take these medications    PROAIR HFA 90 mcg/actuation inhaler  Generic drug: albuterol  Inhale 2 puffs into the lungs every 4 (four) hours as needed for Wheezing. Rescue  What changed:   · when to take this  · reasons to take this  · additional instructions        CONTINUE taking these medications    diclofenac sodium 1 % Gel  Commonly known as: VOLTAREN  Apply 2 g topically 4 (four) times daily. for 14 days     ibuprofen 800 MG tablet  Commonly known as: ADVIL,MOTRIN  Take 800 mg by mouth every 6 (six) hours as needed for Pain.        ASK your doctor about these medications    loratadine 10 mg tablet  Commonly known as: CLARITIN  Take 1 tablet (10 mg total) by mouth once daily.            Indwelling Lines/Drains at time of discharge:   Lines/Drains/Airways     None                 Time spent on the discharge of patient: 30 minutes         Kierra Reynaga MD  Department of Hospital Medicine  [facility]

## 2022-09-14 NOTE — PROGRESS NOTES
Patient is 96% on room air. While walking she dropped to 87-88% for a split second but while we were walking maintained between 90 and 93%.  She also recovered on room air to 96%. Nurse and Dr. Reynaga notified.  Patient is on room air with no difficulty breathing or complaints at time.

## 2022-09-14 NOTE — PLAN OF CARE
[facility] - Telemetry  Discharge Final Note    Primary Care Provider: Primary Doctor No    Expected Discharge Date: 9/14/2022    Final Discharge Note (most recent)       Final Note - 09/14/22 1227          Final Note    Assessment Type Final Discharge Note     Anticipated Discharge Disposition Home or Self Care     What phone number can be called within the next 1-3 days to see how you are doing after discharge? [phone]     Hospital Resources/Appts/Education Provided Appointments scheduled and added to AVS;Appointments scheduled by Navigator/Coordinator        Post-Acute Status    Other Status No Post-Acute Service Needs     Discharge Delays None known at this time                     Important Message from Medicare             Contact Info       [facility] [place]    [address]  [place] [address]   Phone: [phone]       Next Steps: Follow up    Instructions: please call at time of d/c to schedule followup appointment and establish care for future medical needs          [facility] secure chat nurse Marla that patient cleared from case management standpoint.

## 2022-09-14 NOTE — NURSING
In preparation for discharge, d/c'd pt's saline lock, applied pressure to site, secured gauze and coban, no redness or swelling noted. Instructions given.Reviewed all new meds, continued medications, follow up appointments and signs and symptoms to report to PCP or seek emergency medical care. Pt verbalized understanding to all instructions and education. Pt denies any complaints or concerns at this time. Pt was transported off the unit to car for discharge.

## 2022-09-15 LAB
BACTERIA BLD CULT: ABNORMAL

## 2022-09-16 ENCOUNTER — PATIENT OUTREACH (OUTPATIENT)
Dept: ADMINISTRATIVE | Facility: CLINIC | Age: 32
End: 2022-09-16
Payer: MEDICAID

## 2022-09-16 DIAGNOSIS — J18.9 PNEUMONIA DUE TO INFECTIOUS ORGANISM, UNSPECIFIED LATERALITY, UNSPECIFIED PART OF LUNG: Primary | ICD-10-CM

## 2022-09-16 LAB — BACTERIA BLD CULT: NORMAL

## 2022-09-16 NOTE — PROGRESS NOTES
C3 nurse spoke with Zandra Phillips for a TCC post hospital discharge follow up call. The patient reports does not have a scheduled HOSFU appointment. C3 nurse was unable to schedule HOSFU appointment for [facility]. Patient advised to contact their PCP to schedule a HOSPFU within 5-7 days. NP HV referral placed.

## 2022-09-17 LAB — BACTERIA BLD CULT: NORMAL

## 2022-09-20 ENCOUNTER — NURSE TRIAGE (OUTPATIENT)
Dept: ADMINISTRATIVE | Facility: CLINIC | Age: 32
End: 2022-09-20
Payer: MEDICAID

## 2022-09-20 ENCOUNTER — PES CALL (OUTPATIENT)
Dept: ADMINISTRATIVE | Facility: CLINIC | Age: 32
End: 2022-09-20
Payer: MEDICAID

## 2022-09-20 NOTE — TELEPHONE ENCOUNTER
Reason for Disposition   [1] Caller requesting NON-URGENT health information AND [2] PCP's office is the best resource    Additional Information   Negative: [1] Caller is not with the adult (patient) AND [2] reporting urgent symptoms   Negative: Lab result questions   Negative: Medication questions   Negative: Caller can't be reached by phone   Negative: Caller has already spoken to PCP or another triager   Negative: RN needs further essential information from caller in order to complete triage   Negative: Requesting regular office appointment    Protocols used: Information Only Call-A-AH  Pt requesting explanation of lab and culture results from her hospital admission. Advised per triage policy triage nurses cannot discuss results.    Advised to speak with Charge nurse or hospitalist about lab results. Warm transferred to  for assistance to transfer pt to the nursing station she discharged from. Pt verbalized understanding.

## 2022-10-04 ENCOUNTER — OFFICE VISIT (OUTPATIENT)
Dept: HOME HEALTH SERVICES | Facility: CLINIC | Age: 32
End: 2022-10-04
Payer: MEDICAID

## 2022-10-04 VITALS
HEART RATE: 77 BPM | RESPIRATION RATE: 16 BRPM | HEIGHT: 70 IN | SYSTOLIC BLOOD PRESSURE: 128 MMHG | DIASTOLIC BLOOD PRESSURE: 64 MMHG | BODY MASS INDEX: 41.95 KG/M2 | WEIGHT: 293 LBS

## 2022-10-04 DIAGNOSIS — J45.20 UNCONTROLLED INTERMITTENT ASTHMA: Primary | ICD-10-CM

## 2022-10-04 DIAGNOSIS — J45.909 ASTHMA, UNSPECIFIED ASTHMA SEVERITY, UNSPECIFIED WHETHER COMPLICATED, UNSPECIFIED WHETHER PERSISTENT: ICD-10-CM

## 2022-10-04 DIAGNOSIS — J18.9 PNEUMONIA DUE TO INFECTIOUS ORGANISM, UNSPECIFIED LATERALITY, UNSPECIFIED PART OF LUNG: ICD-10-CM
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
## 2022-10-04 PROBLEM — Z98.891 PREVIOUS CESAREAN SECTION: Status: ACTIVE | Noted: 2019-10-01

## 2022-10-04 PROBLEM — M79.631 RIGHT FOREARM PAIN: Status: RESOLVED | Noted: 2020-09-18 | Resolved: 2022-10-04

## 2022-10-04 PROCEDURE — 1111F PR DISCHARGE MEDS RECONCILED W/ CURRENT OUTPATIENT MED LIST: ICD-10-PCS | Mod: CPTII,S$GLB,, | Performed by: NURSE PRACTITIONER

## 2022-10-04 PROCEDURE — 1159F PR MEDICATION LIST DOCUMENTED IN MEDICAL RECORD: ICD-10-PCS | Mod: CPTII,S$GLB,, | Performed by: NURSE PRACTITIONER

## 2022-10-04 PROCEDURE — 3078F DIAST BP <80 MM HG: CPT | Mod: CPTII,S$GLB,, | Performed by: NURSE PRACTITIONER

## 2022-10-04 PROCEDURE — 1159F MED LIST DOCD IN RCRD: CPT | Mod: CPTII,S$GLB,, | Performed by: NURSE PRACTITIONER

## 2022-10-04 PROCEDURE — 3078F PR MOST RECENT DIASTOLIC BLOOD PRESSURE < 80 MM HG: ICD-10-PCS | Mod: CPTII,S$GLB,, | Performed by: NURSE PRACTITIONER

## 2022-10-04 PROCEDURE — 3074F PR MOST RECENT SYSTOLIC BLOOD PRESSURE < 130 MM HG: ICD-10-PCS | Mod: CPTII,S$GLB,, | Performed by: NURSE PRACTITIONER

## 2022-10-04 PROCEDURE — 3008F PR BODY MASS INDEX (BMI) DOCUMENTED: ICD-10-PCS | Mod: CPTII,S$GLB,, | Performed by: NURSE PRACTITIONER

## 2022-10-04 PROCEDURE — 3074F SYST BP LT 130 MM HG: CPT | Mod: CPTII,S$GLB,, | Performed by: NURSE PRACTITIONER

## 2022-10-04 PROCEDURE — 3008F BODY MASS INDEX DOCD: CPT | Mod: CPTII,S$GLB,, | Performed by: NURSE PRACTITIONER

## 2022-10-04 PROCEDURE — 99350 PR HOME VISIT,ESTAB PATIENT,LEVEL IV: ICD-10-PCS | Mod: S$GLB,,, | Performed by: NURSE PRACTITIONER

## 2022-10-04 PROCEDURE — 1160F PR REVIEW ALL MEDS BY PRESCRIBER/CLIN PHARMACIST DOCUMENTED: ICD-10-PCS | Mod: CPTII,S$GLB,, | Performed by: NURSE PRACTITIONER

## 2022-10-04 PROCEDURE — 1111F DSCHRG MED/CURRENT MED MERGE: CPT | Mod: CPTII,S$GLB,, | Performed by: NURSE PRACTITIONER

## 2022-10-04 PROCEDURE — 99350 HOME/RES VST EST HIGH MDM 60: CPT | Mod: S$GLB,,, | Performed by: NURSE PRACTITIONER

## 2022-10-04 PROCEDURE — 1160F RVW MEDS BY RX/DR IN RCRD: CPT | Mod: CPTII,S$GLB,, | Performed by: NURSE PRACTITIONER

## 2022-10-04 RX ORDER — FLUTICASONE PROPIONATE AND SALMETEROL XINAFOATE 230; 21 UG/1; UG/1
2 AEROSOL, METERED RESPIRATORY (INHALATION) 2 TIMES DAILY
Qty: 12 G | Refills: 1 | Status: SHIPPED | OUTPATIENT
Start: 2022-10-04 | End: 2023-10-04

## 2022-10-04 RX ORDER — LORATADINE 10 MG/1
10 TABLET ORAL DAILY
Qty: 30 TABLET | Refills: 1 | Status: SHIPPED | OUTPATIENT
Start: 2022-10-04 | End: 2022-12-03

## 2022-10-04 NOTE — PATIENT INSTRUCTIONS
Instructions:  - [facility] Nurse Practitioner to schedule home follow-up visit needed.  - Continue all medications, treatments and therapies as ordered.   - Follow all instructions, recommendations as discussed.  - Maintain Safety Precautions at all times.  - Attend all medical appointments as scheduled.  - For worsening symptoms: call Primary Care Physician or Nurse Practitioner.  - For emergencies, call 911 or immediately report to the nearest emergency room.  - Limit Risks of environmental exposure to coronavirus/COVID-19 as discussed including: social distancing, hand hygiene, and limiting departures from the home for necessities only.

## 2022-10-04 NOTE — PROGRESS NOTES
[facility]  Transition of Care Home Visit    Visit Date: 10/4/2022  Encounter Provider:  Eduardo COREA  PCP:  Primary Doctor No    PRESENTING HISTORY      Patient ID: Zandra Phillips is a 32 y.o. female.    Consult Requested By:  Jeronimo Muñoz  Reason for Consult:  Hospital Follow Up    Zandra is being seen at home due to physical debility that presents a taxing effort to leave the home, to mitigate high risk of hospital readmission and/or due to the limited availability of reliable or safe options for transportation to the point of access to the provider. Prior treatment on this was reviewed and patient verbal consent was obtained.         Chief Complaint: Transitional Care      History of Present Illness: Ms. Zandra Phillips is a 32 y.o. female who was recently admitted to the hospital.      32 year old female presents to the ED with c/o productive cough and worsening asthma when walking for 4 days.  She states that her typical asthma medications is not working, last used albuterol inhaler several hours PTA.  Patient denies rash, fever, chest pain, numbness, weakness, tingling, abdominal pain, back pain, dysuria, hematuria, nausea, vomiting, diarrhea, or any other complaints.  PMHx: Asthma         * No surgery found *       Hospital Course:   Patient was admitted with acute respiratory failure secondary to asthma exacerbation with possible underlying pneumonia.  She was started on empiric antibiotic for pneumonia and also bronchodilators and IV steroid for asthma exacerbation.  She also received as needed nasal cannula oxygen.  Over the course of admission her shortness of breath significantly improved/resolved and she responded really well to therapy.  She was able to do some physical activities with no significant limitations from her breathing.  She was eventually discharged home on cefdinir and azithromycin and oral prednisone.    Patient with significant morbidly obese with BMI 57.  She was extensively  counseled on need for weight loss as she might have some underlying chronic respiratory failure from her severe obesity.  Patient instructed to follow up with the PCP.  She was advised to return to the ER if symptoms worsens        Goals of Care Treatment Preferences:  Code Status: Full Code        Consults:           Consults (From admission, onward)         Status Ordering Provider       Case Management/  Once        Provider:  (Not yet assigned)    ISIAH Edwards             * Pneumonia  Patient with acute respiratory failure secondary to asthma exacerbation and pneumonia  Eventually weaned off of oxygen and discharged home on azithromycin and cefdinir     Morbid obesity  Body mass index is 57.03 kg/m². Morbid obesity complicates all aspects of disease management from diagnostic modalities to treatment. Weight loss encouraged and health benefits explained to patient.                   HTN (hypertension)  Patient BP been uncontrolled since admission states history of HTN during pregnancy. BP usually normal but when under stress its elevated.  Monitor for now.... patient might needed to be started on BP meds         Asthma  See above                  Final Active Diagnoses:     Diagnosis Date Noted POA    PRINCIPAL PROBLEM:  Pneumonia [J18.9] 09/12/2022 Yes    Asthma [J45.909] 09/12/2022 Yes    HTN (hypertension) [I10] 09/12/2022 Yes    Morbid obesity [E66.01] 09/12/2022 Yes       Problems Resolved During this Admission:         Discharged Condition: stable     Disposition: Home or Self Care     Follow Up:         Follow-up Information           [facility].    Why: please call at time of d/c to schedule followup appointment and establish care for future medical needs  Contact information:  [address] [place] [address]  [phone]                              Patient Instructions:       Diet Cardiac      Notify your health care provider if you experience  any of the following:  persistent nausea and vomiting or diarrhea      Notify your health care provider if you experience any of the following:  difficulty breathing or increased cough      Activity as tolerated         Significant Diagnostic Studies: Radiology: X-Ray: CXR: X-Ray Chest 1 View (CXR): No results found for this visit on 09/11/22.         Pending Diagnostic Studies:      None          Medications:  Reconciled Home Medications:       Medication List       START taking these medications    azithromycin 500 MG tablet  Commonly known as: ZITHROMAX  Take 1 tablet (500 mg total) by mouth once daily for 3 days      benzonatate 100 MG capsule  Commonly known as: TESSALON  Take 1 capsule (100 mg total) by mouth 3 (three) times daily as needed for Cough.      cefdinir 300 MG capsule  Commonly known as: OMNICEF  Take 1 capsule (300 mg total) by mouth 2 (two) times daily for 5 days      predniSONE 10 MG tablet  Commonly known as: DELTASONE  Take 1 tablet (10 mg total) by mouth once daily for 3 days          CHANGE how you take these medications    PROAIR HFA 90 mcg/actuation inhaler  Generic drug: albuterol  Inhale 2 puffs into the lungs every 4 (four) hours as needed for Wheezing. Rescue  What changed:   when to take this  reasons to take this  additional instructions          CONTINUE taking these medications    diclofenac sodium 1 % Gel  Commonly known as: VOLTAREN  Apply 2 g topically 4 (four) times daily. for 14 days      ibuprofen 800 MG tablet  Commonly known as: ADVIL,MOTRIN  Take 800 mg by mouth every 6 (six) hours as needed for Pain.          ASK your doctor about these medications    loratadine 10 mg tablet  Commonly known as: CLARITIN  Take 1 tablet (10 mg total) by mouth once daily.                Indwelling Lines/Drains at time of discharge:       Lines/Drains/Airways      None                      Time spent on the discharge of patient: 30 minutes           Keirra Reynaga MD  Department of  [facility] - Telemetry      Electronically signed by Kierra Reynaga    Today: With this visit today patient is ambulatory, answered the front door and let us in. Pt is A&O x 3, able to verify her name and . Pt states she has been doing well since her recent hospital d/c. She completed all her d/c antibx's and steroids. Pt has rescue inhaler in home, has not had to use recently.      VSS. Denies fever, chest pain, shortness of breath, nausea, vomiting, diarrhea.  All hospital discharge orders reviewed and being followed, all medications reconciled and reviewed, patient and family verbalized understanding. No other needs identified at this time.        HPI:  HPI  Review of Systems   Constitutional:  Negative for activity change, appetite change and fever.   Respiratory:  Negative for cough, chest tightness and shortness of breath.    Cardiovascular:  Negative for chest pain and palpitations.   Gastrointestinal:  Negative for abdominal pain, constipation, diarrhea, nausea and vomiting.   Genitourinary:  Negative for decreased urine volume and difficulty urinating.   Musculoskeletal:  Negative for gait problem and myalgias.   Skin:  Negative for color change, rash and wound.   Neurological:  Negative for dizziness and headaches.   Hematological:  Does not bruise/bleed easily.   Psychiatric/Behavioral:  Negative for confusion and sleep disturbance. The patient is not nervous/anxious.      Assessments:  Environmental: clean, organized, uncluttered  Functional Status: independent  Safety: low risk  Nutritional: adequate  Home Health/DME/Supplies: none    PAST HISTORY:     Past Medical History:   Diagnosis Date    Asthma        Past Surgical History:   Procedure Laterality Date     SECTION      TONSILLECTOMY         Family History   Problem Relation Age of Onset    Diabetes Other     Hypertension Other        Social History     Socioeconomic History    Marital status: Single   Tobacco Use     Smoking status: Some Days    Smokeless tobacco: Never    Tobacco comments:     Pt currently smokes marijuana   Substance and Sexual Activity    Alcohol use: Yes    Drug use: Not Currently       MEDICATIONS & ALLERGIES:     Current Outpatient Medications on File Prior to Visit   Medication Sig Dispense Refill    albuterol (PROAIR HFA) 90 mcg/actuation inhaler Inhale 2 puffs into the lungs every 4 (four) hours as needed for Wheezing. Rescue (Patient not taking: Reported on 9/16/2022) 25.5 g 0    diclofenac sodium (VOLTAREN) 1 % Gel Apply 2 g topically 4 (four) times daily. for 14 days 100 g 0    ibuprofen (ADVIL,MOTRIN) 800 MG tablet Take 800 mg by mouth every 6 (six) hours as needed for Pain.       No current facility-administered medications on file prior to visit.        Review of patient's allergies indicates:  No Known Allergies    OBJECTIVE:     Vital Signs:  Vitals:    10/04/22 1212   BP: 128/64   Pulse: 77   Resp: 16     Body mass index is 56.96 kg/m².     Physical Exam:  Physical Exam  Vitals reviewed.   Constitutional:       General: She is not in acute distress.     Appearance: Normal appearance. She is obese. She is not ill-appearing or toxic-appearing.   HENT:      Head: Atraumatic.   Cardiovascular:      Rate and Rhythm: Normal rate and regular rhythm.      Heart sounds: Normal heart sounds.   Pulmonary:      Effort: Pulmonary effort is normal.      Breath sounds: Normal breath sounds. No decreased breath sounds or wheezing.   Abdominal:      General: There is no distension.      Tenderness: There is no abdominal tenderness.   Skin:     General: Skin is warm.      Capillary Refill: Capillary refill takes less than 2 seconds.   Neurological:      General: No focal deficit present.      Mental Status: She is alert.   Psychiatric:         Behavior: Behavior is cooperative.       Laboratory  Lab Results   Component Value Date    WBC 9.29 09/12/2022    HGB 13.9 09/12/2022    HCT 41.9 09/12/2022    MCV 86  09/12/2022     09/12/2022     No results found for: INR, PROTIME  No results found for: HGBA1C  No results for input(s): POCTGLUCOSE in the last 72 hours.        TRANSITION OF CARE:     [facility] Contact Note: 09/16/22    Family and/or Caretaker present at visit?  No.  Diagnostic tests reviewed/disposition: No diagnosic tests pending after this hospitalization.  Disease/illness education: Importance of compliance with all prescribed medication and treatments, COVID precautions/Social Distancing/Mask Use   Home health/community services discussion/referrals: Patient does not have home health established from hospital visit.  They do not need home health.  If needed, we will set up home health for the patient.   Establishment or re-establishment of referral orders for community resources: No other necessary community resources.   Discussion with other health care providers: No discussion with other health care providers necessary.     Transition of Care Visit:     I have reviewed and updated the history and problem list.  I have reconciled the medication list.  I have discussed the hospitalization and current medical issues, prognosis and plans with the patient/family.  I  spent more than 50% of time discussing the care with the patient/family.  Total Face-to-Face Encounter: 60 minutes.    Medications Reconciliation:   I have reconciled the patient's home medications and discharge medications with the patient/family. I have updated all changes.  Refer to After-Visit Medication List.  I have discussed discharge plans, follow-up instructions, future appointments, provider contact information, indicators to seek medical emergency treatment, and advisement to call with additional questions or concerns. Patient and caregiver verbalize understanding.     ASSESSMENT & PLAN:         Zandra was seen today for transitional care.    Diagnoses and all orders for this visit:    Uncontrolled intermittent  asthma  -Ambulatory referral/consult to Pulmonology  Pt only has Albuterol inhaler at home. Pt has taken Advair in the past but no refills. Will refill Advair today.  Claritin started today for seasonal allergies.  Lungs CTA today, no wheezing.  Pt to f/u at [facility].    Pneumonia due to infectious organism, unspecified laterality, unspecified part of lung   Ambulatory referral/consult to Pulmonology; Future  Pt has finished all prescribed antibx from hospital d/c.   Denies SOB, wheezing, fever. Lungs CTA     Other orders  -     loratadine (CLARITIN) 10 mg tablet; Take 1 tablet (10 mg total) by mouth once daily.  -     fluticasone-salmeterol 230-21 mcg/dose (ADVAIR HFA) 230-21 mcg/actuation HFAA inhaler; Inhale 2 puffs into the lungs 2 (two) times daily. Controller      Were controlled substances prescribed?  No    Instructions for the patient:  - Continue all medications, treatments and therapies as ordered.   - Follow all instructions, recommendations as discussed.  - Maintain Safety Precautions at all times.  - Attend all medical appointments as scheduled.  - For worsening symptoms: call Primary Care Physician or Nurse Practitioner.  - For emergencies, call 911 or immediately report to the nearest emergency room.  - Limit Risks of environmental exposure to coronavirus/COVID-19 as discussed including: social distancing, hand hygiene, and limiting departures from the home for necessities only.     Scheduled Follow-up :  No future appointments.    After Visit Medication List :     Medication List            Accurate as of October 4, 2022  1:02 PM. If you have any questions, ask your nurse or doctor.                START taking these medications      fluticasone-salmeterol 230-21 mcg/dose 230-21 mcg/actuation Hfaa inhaler  Commonly known as: ADVAIR HFA  Inhale 2 puffs into the lungs 2 (two) times daily. Controller  Started by: Ileana De La Cruz NP     loratadine 10 mg tablet  Commonly known as: CLARITIN  Take 1  tablet (10 mg total) by mouth once daily.  Started by: Ileana De La Cruz NP            CONTINUE taking these medications      albuterol 90 mcg/actuation inhaler  Commonly known as: PROVENTIL/VENTOLIN HFA  Inhale 2 puffs into the lungs every 4 (four) hours as needed for Wheezing. Rescue     diclofenac sodium 1 % Gel  Commonly known as: VOLTAREN  Apply 2 g topically 4 (four) times daily. for 14 days     ibuprofen 800 MG tablet  Commonly known as: ADVIL,MOTMODESTO               Where to Get Your Medications        These medications were sent to [id number] - [place] - [address] [facility] [address]  [place]  [address] [address]      Phone: [phone]   fluticasone-salmeterol 230-21 mcg/dose 230-21 mcg/actuation Hfaa inhaler  loratadine 10 mg tablet         Signature:  Eduardo COREA

## 2022-10-04 NOTE — ASSESSMENT & PLAN NOTE
Pt has finished all prescribed antibx from hospital d/c.   Denies SOB, wheezing, fever. Lungs CTA

## 2022-10-04 NOTE — ASSESSMENT & PLAN NOTE
Pulmonary consult in system today  Pt only has Albuterol inhaler at home. Pt has taken Advair in the past but no refills. Will refill Advair today.  Claritin started today for seasonal allergies.  Lungs CTA today, no wheezing.  Pt to f/u at [facility].

## 2022-10-15 NOTE — SUBJECTIVE & OBJECTIVE
Interval History: Evaluated patient. Still with dyspnea increases with minimal exertion. + mild hypoxia on 2 liters NC.            Review of Systems   Constitutional:  Negative for diaphoresis, fatigue and fever.   Respiratory:  Positive for cough, shortness of breath and wheezing.    Cardiovascular:  Negative for chest pain and leg swelling.   Gastrointestinal:  Negative for abdominal distention and abdominal pain.   Skin:  Negative for rash.   Neurological:  Negative for speech difficulty and numbness.   Objective:     Vital Signs (Most Recent):  Temp: 98.2 °F (36.8 °C) (09/14/22 1124)  Pulse: 74 (09/14/22 1206)  Resp: 18 (09/14/22 1206)  BP: 139/63 (09/14/22 1124)  SpO2: 98 % (09/14/22 1206) Vital Signs (24h Range):        Weight: (!) 180.3 kg (397 lb 7.8 oz)  Body mass index is 57.03 kg/m².  No intake or output data in the 24 hours ending 10/15/22 1036   Physical Exam  Vitals and nursing note reviewed.   Constitutional:       Appearance: She is well-developed.   HENT:      Head: Normocephalic and atraumatic.   Cardiovascular:      Rate and Rhythm: Normal rate and regular rhythm.      Heart sounds: Normal heart sounds.   Pulmonary:      Effort: Pulmonary effort is normal.      Breath sounds: Wheezing present.   Abdominal:      General: Bowel sounds are normal.      Palpations: Abdomen is soft.   Musculoskeletal:         General: Normal range of motion.   Skin:     General: Skin is warm and dry.   Neurological:      Mental Status: She is alert and oriented to person, place, and time.   Psychiatric:         Behavior: Behavior normal.         Judgment: Judgment normal.       Significant Labs: All pertinent labs within the past 24 hours have been reviewed.    Significant Imaging: I have reviewed all pertinent imaging results/findings within the past 24 hours.

## 2022-10-15 NOTE — PROGRESS NOTES
[facility] Medicine  Progress Note    Patient Name: Zandra Phillips  MRN: [id number]  Patient Class: IP- Inpatient   Admission Date: 9/11/2022  Length of Stay: 3 days  Attending Physician: No att. providers found  Primary Care Provider: Primary Doctor No        Subjective:     Principal Problem:Pneumonia        HPI:  32 year old female presents to the ED with c/o productive cough and worsening asthma when walking for 4 days.  She states that her typical asthma medications is not working, last used albuterol inhaler several hours PTA.  Patient denies rash, fever, chest pain, numbness, weakness, tingling, abdominal pain, back pain, dysuria, hematuria, nausea, vomiting, diarrhea, or any other complaints.  PMHx: Asthma                  Overview/Hospital Course:  Patient was admitted with acute respiratory failure secondary to asthma exacerbation with possible underlying pneumonia.  She was started on empiric antibiotic for pneumonia and also bronchodilators and IV steroid for asthma exacerbation.  She also received as needed nasal cannula oxygen.  Over the course of admission her shortness of breath significantly improved/resolved and she responded really well to therapy.  She was able to do some physical activities with no significant limitations from her breathing.  She was eventually discharged home on cefdinir and azithromycin and oral prednisone.    Patient with significant morbidly obese with BMI 57.  She was extensively counseled on need for weight loss as she might have some underlying chronic respiratory failure from her severe obesity.  Patient instructed to follow up with the PCP.  She was advised to return to the ER if symptoms worsens      Interval History: Evaluated patient. Still with dyspnea increases with minimal exertion. + mild hypoxia on 2 liters NC.            Review of Systems   Constitutional:  Negative for diaphoresis, fatigue and fever.   Respiratory:  Positive for cough,  shortness of breath and wheezing.    Cardiovascular:  Negative for chest pain and leg swelling.   Gastrointestinal:  Negative for abdominal distention and abdominal pain.   Skin:  Negative for rash.   Neurological:  Negative for speech difficulty and numbness.   Objective:     Vital Signs (Most Recent):  Temp: 98.2 °F (36.8 °C) (09/14/22 1124)  Pulse: 74 (09/14/22 1206)  Resp: 18 (09/14/22 1206)  BP: 139/63 (09/14/22 1124)  SpO2: 98 % (09/14/22 1206) Vital Signs (24h Range):        Weight: (!) 180.3 kg (397 lb 7.8 oz)  Body mass index is 57.03 kg/m².  No intake or output data in the 24 hours ending 10/15/22 1036   Physical Exam  Vitals and nursing note reviewed.   Constitutional:       Appearance: She is well-developed.   HENT:      Head: Normocephalic and atraumatic.   Cardiovascular:      Rate and Rhythm: Normal rate and regular rhythm.      Heart sounds: Normal heart sounds.   Pulmonary:      Effort: Pulmonary effort is normal.      Breath sounds: Wheezing present.   Abdominal:      General: Bowel sounds are normal.      Palpations: Abdomen is soft.   Musculoskeletal:         General: Normal range of motion.   Skin:     General: Skin is warm and dry.   Neurological:      Mental Status: She is alert and oriented to person, place, and time.   Psychiatric:         Behavior: Behavior normal.         Judgment: Judgment normal.       Significant Labs: All pertinent labs within the past 24 hours have been reviewed.    Significant Imaging: I have reviewed all pertinent imaging results/findings within the past 24 hours.      Assessment/Plan:      * Pneumonia  Patient with acute respiratory failure secondary to asthma exacerbation and pneumonia  Eventually weaned off of oxygen and discharged home on azithromycin and cefdinir    Home oxygen eval with desat 90%. Not on home oxygen.  Will continue IV abx, IV steroids, and duonebs.   Repeat blood culture. One +staph likely contaminate.   Check procal- possibly viral etiology.    Add zyretec.     Morbid obesity  Body mass index is 57.03 kg/m². Morbid obesity complicates all aspects of disease management from diagnostic modalities to treatment. Weight loss encouraged and health benefits explained to patient.         HTN (hypertension)  Patient BP been uncontrolled since admission states history of HTN during pregnancy. BP usually normal but when under stress its elevated.  Monitor for now.... patient might needed to be started on BP meds       Asthma  See above         VTE Risk Mitigation (From admission, onward)         Ordered     IP VTE HIGH RISK PATIENT  Once         09/11/22 2020                Discharge Planning   MALIA: 9/14/2022     Code Status: Prior   Is the patient medically ready for discharge?:     Reason for patient still in hospital (select all that apply): Laboratory test and Treatment  Discharge Plan A: Home with family (with [facility] information)   Discharge Delays: None known at this time              Alma Soriano NP  Department of Hospital Medicine   [facility]

## 2022-10-15 NOTE — ASSESSMENT & PLAN NOTE
Patient with acute respiratory failure secondary to asthma exacerbation and pneumonia  Eventually weaned off of oxygen and discharged home on azithromycin and cefdinir    Home oxygen eval with desat 90%. Not on home oxygen.  Will continue IV abx, IV steroids, and duonebs.   Repeat blood culture. One +staph likely contaminate.   Check procal- possibly viral etiology.   Add zyretec.

## 2022-12-19 PROBLEM — J18.9 PNEUMONIA: Status: RESOLVED | Noted: 2022-09-12 | Resolved: 2022-12-19

## 2024-06-13 ENCOUNTER — TELEPHONE (OUTPATIENT)
Dept: ORTHOPEDICS | Facility: CLINIC | Age: 34
End: 2024-06-13
Payer: COMMERCIAL

## 2024-06-13 ENCOUNTER — NURSE TRIAGE (OUTPATIENT)
Dept: ADMINISTRATIVE | Facility: CLINIC | Age: 34
End: 2024-06-13
Payer: COMMERCIAL

## 2024-06-13 ENCOUNTER — TELEPHONE (OUTPATIENT)
Dept: ADMINISTRATIVE | Facility: CLINIC | Age: 34
End: 2024-06-13
Payer: COMMERCIAL

## 2024-06-13 NOTE — TELEPHONE ENCOUNTER
Returned the pt's call to help get her scheduled the pt place me on hold, after holding on for a little while I hung up and the pt has not called back yet.

## 2024-06-13 NOTE — TELEPHONE ENCOUNTER
Patient provided information on in-network orthopedic provider accepting new patients.  Patient transferred to  for further assistance with appointment needs.  ED Navigator to close encounter at this time.

## 2024-06-13 NOTE — TELEPHONE ENCOUNTER
----- Message from Silvia Montgomery sent at 6/13/2024 11:56 AM CDT -----  Type:  Sooner Apoointment Request    Caller is requesting a sooner appointment.  Caller declined first available appointment listed below.  Caller will not accept being placed on the waitlist and is requesting a message be sent to doctor.  Name of Caller:pt  When is the first available appointment?na  Symptoms:Other closed fracture of distal end of right fibula, initial encounter [S82.831A...  Mild ankle sprain, left, initial encounter [S93.402A]   Would the patient rather a call back or a response via [facility]? call  Best Call Back Number:[phone]  Additional Information:

## 2024-06-13 NOTE — TELEPHONE ENCOUNTER
Spoke with pt, pt states that she can use her other insurance as her primary. Pt will call to have insurance switched. If pt still still can't get scheduled she will call [facility].   ----- Message from Silvia Montgomery sent at 6/13/2024 11:56 AM CDT -----  Type:  Sooner Apoointment Request    Caller is requesting a sooner appointment.  Caller declined first available appointment listed below.  Caller will not accept being placed on the waitlist and is requesting a message be sent to doctor.  Name of Caller:pt  When is the first available appointment?na  Symptoms:Other closed fracture of distal end of right fibula, initial encounter [S82.681A...  Mild ankle sprain, left, initial encounter [S93.402A]   Would the patient rather a call back or a response via [employer]? call  Best Call Back Number:[phone]  Additional Information:

## 2024-06-17 ENCOUNTER — OFFICE VISIT (OUTPATIENT)
Dept: ORTHOPEDICS | Facility: CLINIC | Age: 34
End: 2024-06-17
Payer: COMMERCIAL

## 2024-06-17 ENCOUNTER — HOSPITAL ENCOUNTER (OUTPATIENT)
Dept: RADIOLOGY | Facility: HOSPITAL | Age: 34
Discharge: HOME | End: 2024-06-17
Payer: COMMERCIAL

## 2024-06-17 ENCOUNTER — TELEPHONE (OUTPATIENT)
Dept: ORTHOPEDICS | Facility: CLINIC | Age: 34
End: 2024-06-17
Payer: COMMERCIAL

## 2024-06-17 DIAGNOSIS — S82.831A OTHER CLOSED FRACTURE OF DISTAL END OF RIGHT FIBULA, INITIAL ENCOUNTER: ICD-10-CM

## 2024-06-17 DIAGNOSIS — S93.402A MILD ANKLE SPRAIN, LEFT, INITIAL ENCOUNTER: ICD-10-CM

## 2024-06-17 DIAGNOSIS — S82.831A OTHER CLOSED FRACTURE OF DISTAL END OF RIGHT FIBULA, INITIAL ENCOUNTER: Primary | ICD-10-CM

## 2024-06-17 PROCEDURE — 1159F MED LIST DOCD IN RCRD: CPT | Mod: CPTII,S$GLB,,

## 2024-06-17 PROCEDURE — 29705 RMVL/BIVLV FULL ARM/LEG CAST: CPT | Mod: RT,S$GLB,, | Performed by: ORTHOPAEDIC SURGERY

## 2024-06-17 PROCEDURE — 1160F RVW MEDS BY RX/DR IN RCRD: CPT | Mod: CPTII,S$GLB,,

## 2024-06-17 PROCEDURE — 99213 OFFICE O/P EST LOW 20 MIN: CPT | Mod: S$GLB,,,

## 2024-06-17 PROCEDURE — 99999 PR PBB SHADOW E&M-EST. PATIENT-LVL III: CPT | Mod: PBBFAC,,,

## 2024-06-17 PROCEDURE — 73610 X-RAY EXAM OF ANKLE: CPT | Mod: 26,RT,, | Performed by: RADIOLOGY

## 2024-06-17 PROCEDURE — 73610 X-RAY EXAM OF ANKLE: CPT | Mod: TC,RT

## 2024-06-17 RX ORDER — LEG BRACE
1 EACH MISCELLANEOUS DAILY
Qty: 1 EACH | Refills: 0 | Status: SHIPPED | OUTPATIENT
Start: 2024-06-17 | End: 2024-06-18

## 2024-06-17 RX ORDER — IBUPROFEN 800 MG/1
800 TABLET ORAL EVERY 6 HOURS PRN
Qty: 20 TABLET | Refills: 0 | Status: SHIPPED | OUTPATIENT
Start: 2024-06-17

## 2024-06-17 NOTE — PROGRESS NOTES
Patient presented to cast room for removal of an ortho-glass posterior splint of the right lower extremity. Splint was removed, skin intact, no abnormalities noted.

## 2024-06-17 NOTE — PROGRESS NOTES
SUBJECTIVE:     Chief Complaint & History of Present Illness:  Zandra Phillips is a 33 y.o. female who is seen here today with a complaint of  bilateral ankle pain that is worse in the right.  She was seen in the ED on 06/10/2024 following a fall.  She was diagnosed with a right distal fibula fracture and left ankle sprain and treated with Norco and ibuprofen as needed for pain, nonweightbearing status with crutches in order for a walker, and a stat orthopedic referral.    Today, she endorses a continued moderate dull ache located in the diffuse aspect of each ankle. The pain is worse with any weight bearing and palpation  and improved by nothing. The patient notes pain with inversion of the foot, pain with eversion of the foot, swelling, and inability to bear weight with pain  but no associated redness, stiffness, numbness, or tingling.      Past Medical History:   Diagnosis Date    Asthma        Past Surgical History:   Procedure Laterality Date     SECTION      TONSILLECTOMY         Family History   Problem Relation Name Age of Onset    Diabetes Other      Hypertension Other         Review of patient's allergies indicates:  No Known Allergies        Current Outpatient Medications:     albuterol (PROAIR HFA) 90 mcg/actuation inhaler, Inhale 2 puffs into the lungs every 4 (four) hours as needed for Wheezing. Rescue (Patient not taking: Reported on 2022), Disp: 25.5 g, Rfl: 0    diclofenac sodium (VOLTAREN) 1 % Gel, Apply 2 g topically 4 (four) times daily. for 14 days, Disp: 100 g, Rfl: 0    fluticasone-salmeterol 230-21 mcg/dose (ADVAIR HFA) 230-21 mcg/actuation HFAA inhaler, Inhale 2 puffs into the lungs 2 (two) times daily. Controller, Disp: 12 g, Rfl: 1    HYDROcodone-acetaminophen (NORCO) 7.5-325 mg per tablet, Take 1 tablet by mouth every 6 (six) hours as needed for Pain., Disp: 10 tablet, Rfl: 0    ibuprofen (ADVIL,MOTRIN) 800 MG tablet, Take 800 mg by mouth every 6 (six) hours as needed for  Pain., Disp: , Rfl:     ibuprofen (ADVIL,MOTRIN) 800 MG tablet, Take 1 tablet (800 mg total) by mouth every 6 (six) hours as needed for Pain., Disp: 20 tablet, Rfl: 0    loratadine (CLARITIN) 10 mg tablet, Take 1 tablet (10 mg total) by mouth once daily., Disp: 30 tablet, Rfl: 1      Review of Systems:  ROS:  The updated medical history is in the chart and has been reviewed. A review of systems is updated and there is no reported vision changes, ear/nose/mouth/throat complaints, chest pain, shortness of breath, abdominal pain, urological complaints, fevers or chills, psychiatric complaints. Musculoskeletal and neurologcial symptoms are as documented. All other systems are negative.      OBJECTIVE:     PHYSICAL EXAM:  [facility] 05/31/2024   General: Pleasant, cooperative, NAD.  HEENT: NCAT, sclera nonicteric.  Lungs: Respirations are equal and unlabored.   Abdomen: Soft and non-tender.  CV: 2+ bilateral upper and lower extremity pulses.  Neuro: Sensation intact to light touch.  Skin: Intact throughout LE with no rashes, erythema, or lesions.  Extremities: No LE edema, NVI lower extremities. Gait not assessed.    right Foot/Ankle:  Skin:  Small abrasions noted to the proximal anterior shin  Swelling:  Mild-to-moderate  Warmth: no warmth  Tenderness: diffuse and maximal at lateral malleolus  ROM: 15 degrees dorsiflexion, 20 degrees plantarflexion, 10 degrees inversion, and 5 degrees eversion  Strength: 5/5 tibialis anterior strength and 5/5 gastroc-soleus strength  Stability: stable to testing  Neurological Exam: normal  Vascular Exam: Tibialis posterior pulse: present and Dorsalis pedis pulse: present    left Foot/Ankle:  Skin: normal  Swelling:  Mild-to-moderate  Warmth: no warmth  Tenderness: mild inferior to medial malleolus  ROM: 15 degrees dorsiflexion, 25 degrees plantarflexion, 15 degrees inversion, and 8 degrees eversion  Strength: 5/5 tibialis anterior strength and 5/5 gastroc-soleus strength  Stability: stable to  testing  Neurological Exam: normal  Vascular Exam: Tibialis posterior pulse: present and Dorsalis pedis pulse: present      RADIOGRAPHS:  X-rays of the right ankle taken today personally reviewed. Imaging reveals mildly displaced fracture of the distal tibia at the level of the syndesmosis correlating with [place].    X-rays of the left ankle taken on 06/10/2024 personally reviewed. Imaging reveals well-maintained joint space with no acute fractures or dislocations.      ASSESSMENT:       ICD-10-CM ICD-9-CM   1. Other closed fracture of distal end of right fibula, initial encounter  S82.831A 824.8   2. Mild ankle sprain, left, initial encounter  S93.402A 845.00       PLAN:     We discussed with the patient at length all the different treatment options available for her ankles, including anti-inflammatories, acetaminophen, rest, ice, physical therapy to include strengthening, range of motion exercise, ultrasound, splinting/bracing, occasional cortisone injections for temporary relief,  or possible surgical interventions.    Patient placed right ankle boot.  Continue nonweightbearing status to the right foot until being seen in clinic with Bianca Buenrostro next week.    Patient also placed in left ankle brace for stability.    Continue ibuprofen 800 mg as needed for pain.  Patient may also alternate with Tylenol 650 mg as needed for pain.    Follow up in clinic with Bianca Buenrostro in one-week.

## 2024-06-17 NOTE — TELEPHONE ENCOUNTER
Unable to lvm message said unable to complete call at this time    ----- Message from Jo Gutierrez sent at 6/17/2024  2:35 PM CDT -----  Regarding: Late to Appt  Contact: Pt @[phone]  Pt is calling to advise the office of running late for their appt today. Pt is still asking to be seen. Please call to advise. Thanks.             Appt time: 3;00 pm         ETA: 8mins

## 2024-06-18 DIAGNOSIS — S82.831A OTHER CLOSED FRACTURE OF DISTAL END OF RIGHT FIBULA, INITIAL ENCOUNTER: Primary | ICD-10-CM

## 2024-06-18 DIAGNOSIS — S93.402A MILD ANKLE SPRAIN, LEFT, INITIAL ENCOUNTER: ICD-10-CM

## 2024-06-18 NOTE — PROGRESS NOTES
Zandra Phillips has a mobility limitation that significantly impairs her ability to participate in one or more mobility related activities of daily living (MRADL's) such as toileting, feeding, dressing, grooming, and bathing in customary locations in the home. The mobility limitation cannot be sufficiently resolved by the use of a cane or walker. The use of a manual wheelchair will significantly improve the patient's ability to participate in MRADLS and the patient will use it on regular basis in the home. Zandra Phillips has expressed her willingness to use a manual wheelchair in the home. Patient's upper body strength is sufficient for propulsion. She also has a caregiver who is available, willing, and able to provide assistance with the wheelchair when needed .

## 2024-06-27 ENCOUNTER — OFFICE VISIT (OUTPATIENT)
Dept: ORTHOPEDICS | Facility: CLINIC | Age: 34
End: 2024-06-27
Payer: COMMERCIAL

## 2024-06-27 ENCOUNTER — HOSPITAL ENCOUNTER (OUTPATIENT)
Dept: RADIOLOGY | Facility: HOSPITAL | Age: 34
Discharge: HOME | End: 2024-06-27
Attending: PHYSICIAN ASSISTANT
Payer: COMMERCIAL

## 2024-06-27 DIAGNOSIS — S82.831A OTHER CLOSED FRACTURE OF DISTAL END OF RIGHT FIBULA, INITIAL ENCOUNTER: Primary | ICD-10-CM

## 2024-06-27 DIAGNOSIS — S82.831A OTHER CLOSED FRACTURE OF DISTAL END OF RIGHT FIBULA, INITIAL ENCOUNTER: ICD-10-CM

## 2024-06-27 DIAGNOSIS — S93.402A MILD ANKLE SPRAIN, LEFT, INITIAL ENCOUNTER: ICD-10-CM

## 2024-06-27 PROCEDURE — 73610 X-RAY EXAM OF ANKLE: CPT | Mod: TC,50

## 2024-06-27 PROCEDURE — 99999 PR PBB SHADOW E&M-EST. PATIENT-LVL III: CPT | Mod: PBBFAC,,, | Performed by: PHYSICIAN ASSISTANT

## 2024-06-27 PROCEDURE — 1160F RVW MEDS BY RX/DR IN RCRD: CPT | Mod: CPTII,S$GLB,, | Performed by: PHYSICIAN ASSISTANT

## 2024-06-27 PROCEDURE — 1159F MED LIST DOCD IN RCRD: CPT | Mod: CPTII,S$GLB,, | Performed by: PHYSICIAN ASSISTANT

## 2024-06-27 PROCEDURE — 99213 OFFICE O/P EST LOW 20 MIN: CPT | Mod: S$GLB,,, | Performed by: PHYSICIAN ASSISTANT

## 2024-06-27 RX ORDER — TRAMADOL HYDROCHLORIDE 50 MG/1
50 TABLET ORAL EVERY 6 HOURS PRN
Qty: 15 TABLET | Refills: 0 | Status: SHIPPED | OUTPATIENT
Start: 2024-06-27

## 2024-06-27 NOTE — PROGRESS NOTES
Patient ID: Zandra Phillips is a 34 y.o. female.    Chief Complaint: bilateral ankle pain      HISTORY:  Zandra Phillips is a 34 y.o. female who returns to me today for follow up of right ankle distal fibula fracture (DOI 6/10).  Today she is doing well and reports some improvement in her pain.  Left ankle is also improving.  She has been in a boot.  She has been putting some weight on her ankle      PMH/PSH/FamHx/SocHx:    Unchanged from prior visit.    ROS:  Constitution: Negative for chills, fever and weakness.   Respiratory: Negative for cough and shortness of breath.   Musculoskeletal: Positive for bilateral ankle pain  Psychiatric/Behavioral: The patient is not nervous/anxious.       PHYSICAL EXAM:   Right ankle  Skin intact  TTP along lateral malleolus  No deformity  Mild swelling  NVI    Left ankle  Skin intact  Mild TTP lateral ankle  Stable to testing  Negative anterior drawer  Sensation intact  2+ DP    IMAGING: X-rays of the right ankle, personally reviewed by me, demonstrate minimally displaced fracture distal fibula.     X-rays of the left ankle, personally reviewed by me, demonstrate no acute abnormality.  No fracture or dislocation.     ASSESSMENT/PLAN:    Diagnoses and all orders for this visit:    Other closed fracture of distal end of right fibula, initial encounter  -     X-Ray Ankle Complete Bilateral; Future    Mild ankle sprain, left, initial encounter      - X-rays appear stable today  - Recommend continue tall boot  - Advance WBAT  - Left ankle- ok to wean out of brace as tolerated  - Follow up in 4 weeks with x-rays- standing x-rays right ankle

## 2024-07-02 ENCOUNTER — PATIENT MESSAGE (OUTPATIENT)
Dept: ORTHOPEDICS | Facility: CLINIC | Age: 34
End: 2024-07-02
Payer: COMMERCIAL

## 2024-07-08 ENCOUNTER — PATIENT MESSAGE (OUTPATIENT)
Dept: ORTHOPEDICS | Facility: CLINIC | Age: 34
End: 2024-07-08
Payer: COMMERCIAL

## 2024-07-15 DIAGNOSIS — S82.831A OTHER CLOSED FRACTURE OF DISTAL END OF RIGHT FIBULA, INITIAL ENCOUNTER: ICD-10-CM

## 2024-07-15 RX ORDER — TRAMADOL HYDROCHLORIDE 50 MG/1
50 TABLET ORAL EVERY 6 HOURS PRN
Qty: 15 TABLET | Refills: 0 | Status: SHIPPED | OUTPATIENT
Start: 2024-07-15

## 2024-07-15 RX ORDER — IBUPROFEN 800 MG/1
800 TABLET ORAL EVERY 6 HOURS PRN
Qty: 20 TABLET | Refills: 0 | Status: SHIPPED | OUTPATIENT
Start: 2024-07-15

## 2024-07-25 ENCOUNTER — OFFICE VISIT (OUTPATIENT)
Dept: ORTHOPEDICS | Facility: CLINIC | Age: 34
End: 2024-07-25
Payer: COMMERCIAL

## 2024-07-25 ENCOUNTER — HOSPITAL ENCOUNTER (OUTPATIENT)
Dept: RADIOLOGY | Facility: HOSPITAL | Age: 34
Discharge: HOME | End: 2024-07-25
Attending: PHYSICIAN ASSISTANT
Payer: COMMERCIAL

## 2024-07-25 DIAGNOSIS — S82.831A OTHER CLOSED FRACTURE OF DISTAL END OF RIGHT FIBULA, INITIAL ENCOUNTER: ICD-10-CM

## 2024-07-25 DIAGNOSIS — S93.492D SPRAIN OF OTHER LIGAMENT OF LEFT ANKLE, SUBSEQUENT ENCOUNTER: Primary | ICD-10-CM

## 2024-07-25 DIAGNOSIS — S93.402A MILD ANKLE SPRAIN, LEFT, INITIAL ENCOUNTER: ICD-10-CM

## 2024-07-25 PROCEDURE — 99999 PR PBB SHADOW E&M-EST. PATIENT-LVL III: CPT | Mod: PBBFAC,,, | Performed by: PHYSICIAN ASSISTANT

## 2024-07-25 PROCEDURE — 99213 OFFICE O/P EST LOW 20 MIN: CPT | Mod: S$GLB,,, | Performed by: PHYSICIAN ASSISTANT

## 2024-07-25 PROCEDURE — 73610 X-RAY EXAM OF ANKLE: CPT | Mod: 26,,, | Performed by: INTERNAL MEDICINE

## 2024-07-25 PROCEDURE — 1160F RVW MEDS BY RX/DR IN RCRD: CPT | Mod: CPTII,S$GLB,, | Performed by: PHYSICIAN ASSISTANT

## 2024-07-25 PROCEDURE — 73610 X-RAY EXAM OF ANKLE: CPT | Mod: TC,50

## 2024-07-25 PROCEDURE — 1159F MED LIST DOCD IN RCRD: CPT | Mod: CPTII,S$GLB,, | Performed by: PHYSICIAN ASSISTANT

## 2024-07-25 NOTE — PROGRESS NOTES
Patient ID: Zandra Phillips is a 34 y.o. female.    Chief Complaint: right ankle follow up    HISTORY:  Zandra Phillips is a 34 y.o. female who returns to me today for follow up of right ankle distal fibula fracture.  She is 6 weeks s/p injury.  She feels like she is improving however still having some difficulty ambulating.  She has not been able to return to driving long distance.  She has difficulty with any stairs.  She is still using a walker for ambulation.      PMH/PSH/FamHx/SocHx:    Unchanged from prior visit.    ROS:  Constitution: Negative for chills, fever and weakness.   Respiratory: Negative for cough and shortness of breath.   Musculoskeletal: Positive for right ankle pain  Psychiatric/Behavioral: The patient is not nervous/anxious.       PHYSICAL EXAM:   Right ankle  Skin intact  Mild TTP along distal fibula  Mild stiffness with inversion, eversion  NVI    IMAGING: X-rays of the right ankle, personally reviewed by me, demonstrate healing fracture distal fibula with callus noted- mortise intact.      ASSESSMENT/PLAN:    Zandra was seen today for pain and pain.    Diagnoses and all orders for this visit:    Sprain of other ligament of left ankle, subsequent encounter  -     X-Ray Ankle Complete Bilateral; Future    Other closed fracture of distal end of right fibula, initial encounter      6 weeks s/p distal fibula fracture  - Ok to begin to wean out of boot as tolerated  - Home exercises, use walker until feels she is safely able to ambulate  - FWBAT  - Follow up 4 weeks  - Work restrictions- not able to safely drive at this time and needs to be able to go up and down stairs

## 2024-08-02 ENCOUNTER — PATIENT MESSAGE (OUTPATIENT)
Dept: ORTHOPEDICS | Facility: CLINIC | Age: 34
End: 2024-08-02
Payer: COMMERCIAL

## 2024-08-08 DIAGNOSIS — S82.831A OTHER CLOSED FRACTURE OF DISTAL END OF RIGHT FIBULA, INITIAL ENCOUNTER: ICD-10-CM

## 2024-08-08 RX ORDER — TRAMADOL HYDROCHLORIDE 50 MG/1
50 TABLET ORAL EVERY 6 HOURS PRN
Qty: 15 TABLET | Refills: 0 | Status: SHIPPED | OUTPATIENT
Start: 2024-08-08

## 2024-08-09 RX ORDER — IBUPROFEN 800 MG/1
800 TABLET ORAL EVERY 6 HOURS PRN
Qty: 20 TABLET | Refills: 0 | Status: SHIPPED | OUTPATIENT
Start: 2024-08-09

## 2024-08-22 ENCOUNTER — PATIENT MESSAGE (OUTPATIENT)
Dept: ORTHOPEDICS | Facility: CLINIC | Age: 34
End: 2024-08-22

## 2024-08-22 ENCOUNTER — OFFICE VISIT (OUTPATIENT)
Dept: ORTHOPEDICS | Facility: CLINIC | Age: 34
End: 2024-08-22
Payer: MEDICAID

## 2024-08-22 ENCOUNTER — HOSPITAL ENCOUNTER (OUTPATIENT)
Dept: RADIOLOGY | Facility: HOSPITAL | Age: 34
Discharge: HOME | End: 2024-08-22
Attending: PHYSICIAN ASSISTANT
Payer: MEDICAID

## 2024-08-22 DIAGNOSIS — M25.571 RIGHT ANKLE PAIN, UNSPECIFIED CHRONICITY: ICD-10-CM

## 2024-08-22 DIAGNOSIS — M25.571 RIGHT ANKLE PAIN, UNSPECIFIED CHRONICITY: Primary | ICD-10-CM

## 2024-08-22 DIAGNOSIS — S82.831A OTHER CLOSED FRACTURE OF DISTAL END OF RIGHT FIBULA, INITIAL ENCOUNTER: ICD-10-CM

## 2024-08-22 DIAGNOSIS — S93.492D SPRAIN OF OTHER LIGAMENT OF LEFT ANKLE, SUBSEQUENT ENCOUNTER: ICD-10-CM

## 2024-08-22 PROCEDURE — 73610 X-RAY EXAM OF ANKLE: CPT | Mod: 26,RT,, | Performed by: RADIOLOGY

## 2024-08-22 PROCEDURE — 1160F RVW MEDS BY RX/DR IN RCRD: CPT | Mod: CPTII,,, | Performed by: PHYSICIAN ASSISTANT

## 2024-08-22 PROCEDURE — 1159F MED LIST DOCD IN RCRD: CPT | Mod: CPTII,,, | Performed by: PHYSICIAN ASSISTANT

## 2024-08-22 PROCEDURE — 99213 OFFICE O/P EST LOW 20 MIN: CPT | Mod: S$PBB,,, | Performed by: PHYSICIAN ASSISTANT

## 2024-08-22 PROCEDURE — 73610 X-RAY EXAM OF ANKLE: CPT | Mod: TC,RT

## 2024-08-22 PROCEDURE — 99213 OFFICE O/P EST LOW 20 MIN: CPT | Mod: PBBFAC,25 | Performed by: PHYSICIAN ASSISTANT

## 2024-08-22 PROCEDURE — 99999 PR PBB SHADOW E&M-EST. PATIENT-LVL III: CPT | Mod: PBBFAC,,, | Performed by: PHYSICIAN ASSISTANT

## 2024-08-22 RX ORDER — IBUPROFEN 800 MG/1
800 TABLET ORAL 3 TIMES DAILY
Qty: 40 TABLET | Refills: 0 | Status: SHIPPED | OUTPATIENT
Start: 2024-08-22

## 2024-08-22 RX ORDER — TRAMADOL HYDROCHLORIDE 50 MG/1
50 TABLET ORAL EVERY 6 HOURS PRN
Qty: 20 TABLET | Refills: 0 | Status: SHIPPED | OUTPATIENT
Start: 2024-08-22

## 2024-08-22 NOTE — PROGRESS NOTES
Patient ID: Zandra Phillips is a 34 y.o. female.    Chief Complaint: right ankle follow up    HISTORY:  Zandra Phillips is a 34 y.o. female who returns to me today for follow up of right ankle distal fibula fracture.  She is 3 months s/p injury  She is able to walk without her boot.  She notes some weakness and difficulty with long distance walking.  She has difficulty with stairs and driving long distance.  She will have to drive a lot for work.      PMH/PSH/FamHx/SocHx:    Unchanged from prior visit.    ROS:  Constitution: Negative for chills, fever and weakness.   Respiratory: Negative for cough and shortness of breath.   Musculoskeletal: Positive for right ankle pain  Psychiatric/Behavioral: The patient is not nervous/anxious.       PHYSICAL EXAM:   Right ankle  Skin intact  Mild TTP along distal fibula  Near full ROM  NVI    IMAGING: X-rays of the right ankle, personally reviewed by me, demonstrate healing fracture distal fibula with callus noted- mortise intact.      ASSESSMENT/PLAN:    Diagnoses and all orders for this visit:    Right ankle pain, unspecified chronicity  -     X-Ray Ankle Complete Right; Future    Other closed fracture of distal end of right fibula, initial encounter  -     Ambulatory referral/consult to Physical/Occupational Therapy; Future  -     ibuprofen (ADVIL,MOTRIN) 800 MG tablet; Take 1 tablet (800 mg total) by mouth 3 (three) times daily.  -     traMADoL (ULTRAM) 50 mg tablet; Take 1 tablet (50 mg total) by mouth every 6 (six) hours as needed for Pain.    Sprain of other ligament of left ankle, subsequent encounter  -     Ambulatory referral/consult to Physical/Occupational Therapy; Future  -     ibuprofen (ADVIL,MOTRIN) 800 MG tablet; Take 1 tablet (800 mg total) by mouth 3 (three) times daily.  -     traMADoL (ULTRAM) 50 mg tablet; Take 1 tablet (50 mg total) by mouth every 6 (six) hours as needed for Pain.      10 weeks s/p distal fibula fracture  - PT referral  - Can wear ankle  brace with activity as needed  - Will plan on going back to work once evaluated with PT- will plan for 9/2  - Follow up if needed in 6 weeks

## 2024-08-27 ENCOUNTER — CLINICAL SUPPORT (OUTPATIENT)
Dept: REHABILITATION | Facility: HOSPITAL | Age: 34
End: 2024-08-27
Payer: MEDICAID

## 2024-08-27 DIAGNOSIS — R29.898 DECREASED STRENGTH OF LOWER EXTREMITY: ICD-10-CM

## 2024-08-27 DIAGNOSIS — M25.671 DECREASED RANGE OF MOTION OF BOTH ANKLES: Primary | ICD-10-CM

## 2024-08-27 DIAGNOSIS — S82.831A OTHER CLOSED FRACTURE OF DISTAL END OF RIGHT FIBULA, INITIAL ENCOUNTER: ICD-10-CM

## 2024-08-27 DIAGNOSIS — S93.492D SPRAIN OF OTHER LIGAMENT OF LEFT ANKLE, SUBSEQUENT ENCOUNTER: ICD-10-CM

## 2024-08-27 DIAGNOSIS — M25.672 DECREASED RANGE OF MOTION OF BOTH ANKLES: Primary | ICD-10-CM

## 2024-08-27 PROBLEM — M25.673 DECREASED RANGE OF MOTION OF ANKLE: Status: ACTIVE | Noted: 2024-08-27

## 2024-08-27 PROCEDURE — 97161 PT EVAL LOW COMPLEX 20 MIN: CPT

## 2024-08-27 PROCEDURE — 97110 THERAPEUTIC EXERCISES: CPT

## 2024-08-27 NOTE — PLAN OF CARE
[facility]  Physical Therapy Initial Evaluation    Name: Zandra Phillips  [facility] Number: [id number]    Therapy Diagnosis:   Encounter Diagnoses   Name Primary?    Other closed fracture of distal end of right fibula, initial encounter     Sprain of other ligament of left ankle, subsequent encounter     Decreased range of motion of both ankles Yes    Decreased strength of lower extremity      Physician: Bianca Buenrostro PA-C    Physician Orders: PT Eval and Treat   Medical Diagnosis from Referral: Other closed fracture of distal end of right fibula, initial encounter [S82.831A], Sprain of other ligament of left ankle, subsequent encounter [S93.492D]   Evaluation Date: 8/27/2024  Authorization Period Expiration: 8/22/25  Plan of Care Expiration: 11/19/24  Visit # / Visits authorized: 1/ 1    Time In: 11:00 AM  Time Out: 12:00 PM    Total Billable Time: 60 minutes    Precautions: Standard    Subjective   Date of onset: 6/10/24  History of current condition - Zandra reports: R distal fibular fracture that she sustained on 6/10/24. States was walking out in the parking lot and tripped over the curb which resulted in a distal fibular fracture. States she was initially seen in the ER in which they placed her in a brace on her L ankle and a boot on her R ankle. She was provided with crutches and a wheelchair. States she was non-weight bearing for 6 weeks. States at her most recent follow up imaging revealed healing fracture. Used a combination of wheelchair and walker during period of NWB. Has been weaning out of walking boot for the last few weeks. States she continues to have pain with prolonged standing and walking and reports notable weakness. States driving continues to be difficult as well. Denies n/t, neurological s/s. Pain is primarily localized along lateral leg and states now her knee feels stiff and also bothers. States she is 100% out of the boot however NP advised to continue use of  ankle brace on RLE.        Medical History:   Past Medical History:   Diagnosis Date    Asthma        Surgical History:   Zandra Phillips  has a past surgical history that includes  section and Tonsillectomy.    Medications:   Zandra has a current medication list which includes the following prescription(s): proair hfa, diclofenac sodium, fluticasone-salmeterol 230-21 mcg/dose, ibuprofen, loratadine, and tramadol.    Allergies:   Review of patient's allergies indicates:  No Known Allergies     Imaging, xray: FINDINGS:  The tibiotalar joint alignment is normal.  Healing fracture of the distal fibula, the fracture line is still visible, small callus noted laterally.  No advanced degenerative change.  Pes planus configuration.  The soft tissues appear normal.     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  Prior Level of Function: Independent  Current Level of Function: Limited with ADL's, prolonged standing, walking, occupational duties    Pain:  Current 7/10, worst 9/10, best 6/10   Location: right ankle  Description: Aching, Dull, and stiff  Aggravating Factors: standing, walking, stairs, nighttime  Easing Factors: nothing and elevation    Pts goals: Improve stiffness of R ankle and improve balance with walking    Objective     Posture: pes planus bilar    Gait: patient enters clinic WBAT no AD, decreased weight bearing through RLE, early heel rise    Functional Tests:   SLS EO: Right: 20s, Left: 30s  SLS EC: Right: unable, Left: 15s  Single leg squat: R n/t ; L n/t  Single leg calf raise: R unable ; L unable  DL calf raise: min height on RLE      Ankle Active Range of Motion:   Ankle Right Left   DF 5 10   Plantarflexion 35 45   Inversion 30 40   Eversion 30 40   1st MTP Extension  10 10      Ankle Passive Range of Motion:   Ankle Right Left   DF (knee extended) 5 20   DF (knee bent) 5 20   Plantarflexion 30 30   Inversion  30 40   Eversion 30 40       Lower Extremity Strength   Right  Left    Dorsiflexion 4+/5 5/5   Plantarflexion (standing) 4/5 5/5   Inversion 3+/5 4+/5   Eversion 3+/5 4+/5   Hip extension 3+/5 3/5   PGM 3-/5 3/5       Joint Mobility: hypomobile talocrural joint mobility (R>L)      Palpation: Right: TTP along distal fibula, L TTP along ATFL      Neural provocation:  n/t today            CMS Impairment/Limitation/Restriction for FOTO Ankle Survey    Therapist reviewed FOTO scores for Zandra Phillips on 8/27/2024.   FOTO documents entered into [employer] - see Media section.    Limitation Score: See FOTO in media section%         TREATMENT   Treatment Time In: 11:30  Treatment Time Out: 12:00 PM  Total Treatment time separate from Evaluation: 30 minutes    Zandra received therapeutic exercises to develop strength, endurance, ROM, flexibility, and posture for 20 minutes including:  Ankle pumps 30x  Ankle circles 30x  GTB ankle 4 way 30x each  Seated calf raise 3x10  Standing calf raise 3x10    Zandra received the following manual therapy techniques: Joint mobilizations were applied to the: R ankle for 10 minutes, including:  Grade II/III a/p talocrural joint mobs    Home Exercises and Patient Education Provided    Education provided:   - HEP, POC, symptom management, recovery timeline    Written Home Exercises Provided: yes.  Exercises were reviewed and Zandra was able to demonstrate them prior to the end of the session.  Zandra demonstrated good  understanding of the education provided.     See EMR under Patient Instructions for exercises provided 8/27/2024.    Assessment   Zandra is a 34 y.o. female referred to outpatient Physical Therapy with a medical diagnosis of Other closed fracture of distal end of right fibula, initial encounter [S82.831A], Sprain of other ligament of left ankle, subsequent encounter [S93.492D] . Pt presents with with complaints of continued R ankle pain  consistent with referring dx for  distal fibular fx limiting ADL's and functional activities. Upon evaluation patient presents with decreased ROM, joint mobility and flexibility restrictions, decreased strength and motor control contributing to limited functional status at this time. Patient would benefit from appropriate manual therapy, mobility, flexibility, strengthening and NM re-education in order to address the before-mentioned deficits and return to PLOF with ADL's and occupational duties       Pt prognosis is Good.   Pt will benefit from skilled outpatient Physical Therapy to address the deficits stated above and in the chart below, provide pt/family education, and to maximize pt's level of independence.     Plan of care discussed with patient: Yes  Pt's spiritual, cultural and educational needs considered and patient is agreeable to the plan of care and goals as stated below:     Anticipated Barriers for therapy: high BMI, morbid obesity     Medical Necessity is demonstrated by the following  History  Co-morbidities and personal factors that may impact the plan of care [] LOW: no personal factors / co-morbidities  [x] MODERATE: 1-2 personal factors / co-morbidities  [] HIGH: 3+ personal factors / co-morbidities    Moderate / High Support Documentation:   Co-morbidities affecting plan of care: see medical chart    Personal Factors: high BMI  no deficits     Examination  Body Structures and Functions, activity limitations and participation restrictions that may impact the plan of care [x] LOW: addressing 1-2 elements  [] MODERATE: 3+ elements  [] HIGH: 4+ elements (please support below)    Moderate / High Support Documentation: see eval     Clinical Presentation [x] LOW: stable  [] MODERATE: Evolving  [] HIGH: Unstable     Decision Making/ Complexity Score: low         Goals:  Short Term Goals (4 Weeks):   1. Pts pain decreased 20 - 40% for improved functional mobility and quality of life  2. Pts PROM in R DF increased by 10 degrees to  enhance AROM and functional mobility  3. Pts strength in the BLE increased by 1/2 grade to facilitate improved active mobility and functional stability  4. Pt to exhibit correct return demonstration of exercises for self-management and independence with HEP  5. Pt to demonstrate enhanced neuromuscular response  with single leg static balance for improved functional mobility and gait pattern      Long Term Goals (12 Weeks):  1.  Pts pain level decreased to 75% or greater for with standing for restoring functional mobility and ADL.  2. Pts AROM in all motion increased by 10 degrees to restore functional mobility and ADL  3. Pts strength in the BLE increased by one grade to facilitate improved active mobility and functional stability  4. Pt will be independent with HEP for self-management.   5. Pt to exhibit dynamic stability on the RLE for stable functional mobility and gait.   6. Pt to demonstrate symmetrical weight bearing w/o pain to improve gait pattern with assistive device   7. Patient will improve FOTO score to </= see FOTO% limited to decrease perceived limitation with mobility.      Plan   Plan of care Certification: 8/27/2024 to 11/19/24.    Outpatient Physical Therapy 1-2 times weekly for 12 weeks to include the following interventions: Electrical Stimulation NMES, Gait Training, Manual Therapy, Moist Heat/ Ice, Neuromuscular Re-ed, Patient Education, Self Care, Therapeutic Activities, Therapeutic Exercise, and Dry Needling.     Vladimir Kenney, PT, DPT, SCS  Board Certified Sports Clinical Specialist

## 2024-08-27 NOTE — PROGRESS NOTES
[facility]  Physical Therapy Initial Evaluation    Name: Zandra Phillips  [facility] Number: [id number]    Therapy Diagnosis:   Encounter Diagnoses   Name Primary?    Other closed fracture of distal end of right fibula, initial encounter     Sprain of other ligament of left ankle, subsequent encounter     Decreased range of motion of both ankles Yes    Decreased strength of lower extremity      Physician: Bianca Buenrostro PA-C    Physician Orders: PT Eval and Treat   Medical Diagnosis from Referral: Other closed fracture of distal end of right fibula, initial encounter [S82.831A], Sprain of other ligament of left ankle, subsequent encounter [S93.492D]   Evaluation Date: 8/27/2024  Authorization Period Expiration: 8/22/25  Plan of Care Expiration: 11/19/24  Visit # / Visits authorized: 1/ 1    Time In: 11:00 AM  Time Out: 12:00 PM    Total Billable Time: 60 minutes    Precautions: Standard    Subjective   Date of onset: 6/10/24  History of current condition - Zandra reports: R distal fibular fracture that she sustained on 6/10/24. States was walking out in the parking lot and tripped over the curb which resulted in a distal fibular fracture. States she was initially seen in the ER in which they placed her in a brace on her L ankle and a boot on her R ankle. She was provided with crutches and a wheelchair. States she was non-weight bearing for 6 weeks. States at her most recent follow up imaging revealed healing fracture. Used a combination of wheelchair and walker during period of NWB. Has been weaning out of walking boot for the last few weeks. States she continues to have pain with prolonged standing and walking and reports notable weakness. States driving continues to be difficult as well. Denies n/t, neurological s/s. Pain is primarily localized along lateral leg and states now her knee feels stiff and also bothers. States she is 100% out of the boot however NP advised to continue use of  ankle brace on RLE.        Medical History:   Past Medical History:   Diagnosis Date    Asthma        Surgical History:   Zandra Phillips  has a past surgical history that includes  section and Tonsillectomy.    Medications:   Zandra has a current medication list which includes the following prescription(s): proair hfa, diclofenac sodium, fluticasone-salmeterol 230-21 mcg/dose, ibuprofen, loratadine, and tramadol.    Allergies:   Review of patient's allergies indicates:  No Known Allergies     Imaging, xray: FINDINGS:  The tibiotalar joint alignment is normal.  Healing fracture of the distal fibula, the fracture line is still visible, small callus noted laterally.  No advanced degenerative change.  Pes planus configuration.  The soft tissues appear normal.     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  Prior Level of Function: Independent  Current Level of Function: Limited with ADL's, prolonged standing, walking, occupational duties    Pain:  Current 7/10, worst 9/10, best 6/10   Location: right ankle  Description: Aching, Dull, and stiff  Aggravating Factors: standing, walking, stairs, nighttime  Easing Factors: nothing and elevation    Pts goals: Improve stiffness of R ankle and improve balance with walking    Objective     Posture: pes planus bilar    Gait: patient enters clinic WBAT no AD, decreased weight bearing through RLE, early heel rise    Functional Tests:   SLS EO: Right: 20s, Left: 30s  SLS EC: Right: unable, Left: 15s  Single leg squat: R n/t ; L n/t  Single leg calf raise: R unable ; L unable  DL calf raise: min height on RLE      Ankle Active Range of Motion:   Ankle Right Left   DF 5 10   Plantarflexion 35 45   Inversion 30 40   Eversion 30 40   1st MTP Extension  10 10      Ankle Passive Range of Motion:   Ankle Right Left   DF (knee extended) 5 20   DF (knee bent) 5 20   Plantarflexion 30 30   Inversion  30 40   Eversion 30 40       Lower Extremity Strength   Right  Left    Dorsiflexion 4+/5 5/5   Plantarflexion (standing) 4/5 5/5   Inversion 3+/5 4+/5   Eversion 3+/5 4+/5   Hip extension 3+/5 3/5   PGM 3-/5 3/5       Joint Mobility: hypomobile talocrural joint mobility (R>L)      Palpation: Right: TTP along distal fibula, L TTP along ATFL      Neural provocation:  n/t today            CMS Impairment/Limitation/Restriction for FOTO Ankle Survey    Therapist reviewed FOTO scores for Zandra Phillips on 8/27/2024.   FOTO documents entered into [employer] - see Media section.    Limitation Score: See FOTO in media section%         TREATMENT   Treatment Time In: 11:30  Treatment Time Out: 12:00 PM  Total Treatment time separate from Evaluation: 30 minutes    Zandra received therapeutic exercises to develop strength, endurance, ROM, flexibility, and posture for 20 minutes including:  Ankle pumps 30x  Ankle circles 30x  GTB ankle 4 way 30x each  Seated calf raise 3x10  Standing calf raise 3x10    Zandra received the following manual therapy techniques: Joint mobilizations were applied to the: R ankle for 10 minutes, including:  Grade II/III a/p talocrural joint mobs    Home Exercises and Patient Education Provided    Education provided:   - HEP, POC, symptom management, recovery timeline    Written Home Exercises Provided: yes.  Exercises were reviewed and Zandra was able to demonstrate them prior to the end of the session.  Zandra demonstrated good  understanding of the education provided.     See EMR under Patient Instructions for exercises provided 8/27/2024.    Assessment   Zandra is a 34 y.o. female referred to outpatient Physical Therapy with a medical diagnosis of Other closed fracture of distal end of right fibula, initial encounter [S82.831A], Sprain of other ligament of left ankle, subsequent encounter [S93.492D] . Pt presents with with complaints of continued R ankle pain  consistent with referring dx for  distal fibular fx limiting ADL's and functional activities. Upon evaluation patient presents with decreased ROM, joint mobility and flexibility restrictions, decreased strength and motor control contributing to limited functional status at this time. Patient would benefit from appropriate manual therapy, mobility, flexibility, strengthening and NM re-education in order to address the before-mentioned deficits and return to PLOF with ADL's and occupational duties       Pt prognosis is Good.   Pt will benefit from skilled outpatient Physical Therapy to address the deficits stated above and in the chart below, provide pt/family education, and to maximize pt's level of independence.     Plan of care discussed with patient: Yes  Pt's spiritual, cultural and educational needs considered and patient is agreeable to the plan of care and goals as stated below:     Anticipated Barriers for therapy: high BMI, morbid obesity     Medical Necessity is demonstrated by the following  History  Co-morbidities and personal factors that may impact the plan of care [] LOW: no personal factors / co-morbidities  [x] MODERATE: 1-2 personal factors / co-morbidities  [] HIGH: 3+ personal factors / co-morbidities    Moderate / High Support Documentation:   Co-morbidities affecting plan of care: see medical chart    Personal Factors: high BMI  no deficits     Examination  Body Structures and Functions, activity limitations and participation restrictions that may impact the plan of care [x] LOW: addressing 1-2 elements  [] MODERATE: 3+ elements  [] HIGH: 4+ elements (please support below)    Moderate / High Support Documentation: see eval     Clinical Presentation [x] LOW: stable  [] MODERATE: Evolving  [] HIGH: Unstable     Decision Making/ Complexity Score: low         Goals:  Short Term Goals (4 Weeks):   1. Pts pain decreased 20 - 40% for improved functional mobility and quality of life  2. Pts PROM in R DF increased by 10 degrees to  enhance AROM and functional mobility  3. Pts strength in the BLE increased by 1/2 grade to facilitate improved active mobility and functional stability  4. Pt to exhibit correct return demonstration of exercises for self-management and independence with HEP  5. Pt to demonstrate enhanced neuromuscular response  with single leg static balance for improved functional mobility and gait pattern      Long Term Goals (12 Weeks):  1.  Pts pain level decreased to 75% or greater for with standing for restoring functional mobility and ADL.  2. Pts AROM in all motion increased by 10 degrees to restore functional mobility and ADL  3. Pts strength in the BLE increased by one grade to facilitate improved active mobility and functional stability  4. Pt will be independent with HEP for self-management.   5. Pt to exhibit dynamic stability on the RLE for stable functional mobility and gait.   6. Pt to demonstrate symmetrical weight bearing w/o pain to improve gait pattern with assistive device   7. Patient will improve FOTO score to </= see FOTO% limited to decrease perceived limitation with mobility.      Plan   Plan of care Certification: 8/27/2024 to 11/19/24.    Outpatient Physical Therapy 1-2 times weekly for 12 weeks to include the following interventions: Electrical Stimulation NMES, Gait Training, Manual Therapy, Moist Heat/ Ice, Neuromuscular Re-ed, Patient Education, Self Care, Therapeutic Activities, Therapeutic Exercise, and Dry Needling .     Vladimir Kenney, PT, DPT, SCS  Board Certified Sports Clinical Specialist

## 2024-09-08 NOTE — ASSESSMENT & PLAN NOTE
Patient with acute respiratory failure secondary to asthma exacerbation and pneumonia  Eventually weaned off of oxygen and discharged home on azithromycin and cefdinir   Alert-The patient is alert, awake and responds to voice. The patient is oriented to time, place, and person. The triage nurse is able to obtain subjective information.

## 2024-09-13 ENCOUNTER — DOCUMENTATION ONLY (OUTPATIENT)
Dept: REHABILITATION | Facility: HOSPITAL | Age: 34
End: 2024-09-13
Payer: MEDICAID

## 2024-09-13 NOTE — PROGRESS NOTES
Patient has now no showed her last 2 physical therapy appointments. Called patient however no answer so left voicemail advising on cancel/no show policy and reminder of her next appointment scheduled for 9/19. Patient will be removed from schedule if another cancel/no show.     Vladimir Kenney, PT, DPT, SCS  Board Certified Sports Clinical Specialist

## 2024-11-08 NOTE — TELEPHONE ENCOUNTER
Patient transferred from patient access, requesting refill of pain RX given in ED. Patient does not have a provider. Advised I will send message to ortho relating to referral but if she runs out of pain RX prior to appointment she would need to return to ED if in pain. Triage done-dispo call provider.  Verb understanding.   Reason for Disposition   Caller requesting a CONTROLLED substance prescription refill (e.g., narcotics, ADHD medicines)    Protocols used: Medication Refill and Renewal Call-A-OH    
08-Nov-2024 04:20

## 2024-11-21 ENCOUNTER — PATIENT MESSAGE (OUTPATIENT)
Dept: ORTHOPEDICS | Facility: CLINIC | Age: 34
End: 2024-11-21
Payer: MEDICAID

## 2025-03-20 ENCOUNTER — HOSPITAL ENCOUNTER (EMERGENCY)
Facility: HOSPITAL | Age: 35
Discharge: HOME | End: 2025-03-20
Attending: STUDENT IN AN ORGANIZED HEALTH CARE EDUCATION/TRAINING PROGRAM
Payer: MEDICAID

## 2025-03-20 VITALS
BODY MASS INDEX: 41.02 KG/M2 | SYSTOLIC BLOOD PRESSURE: 149 MMHG | DIASTOLIC BLOOD PRESSURE: 94 MMHG | RESPIRATION RATE: 18 BRPM | OXYGEN SATURATION: 98 % | TEMPERATURE: 99 F | HEIGHT: 71 IN | WEIGHT: 293 LBS | HEART RATE: 75 BPM

## 2025-03-20 DIAGNOSIS — J06.9 VIRAL URI WITH COUGH: Primary | ICD-10-CM

## 2025-03-20 LAB
B-HCG UR QL: NEGATIVE
CTP QC/QA: YES
CTP QC/QA: YES
INFLUENZA A ANTIGEN, POC: NEGATIVE
INFLUENZA B ANTIGEN, POC: NEGATIVE
SARS-COV-2 RDRP RESP QL NAA+PROBE: NEGATIVE

## 2025-03-20 PROCEDURE — 25000003 PHARM REV CODE 250: Mod: ER

## 2025-03-20 PROCEDURE — 81025 URINE PREGNANCY TEST: CPT | Mod: ER

## 2025-03-20 PROCEDURE — 87635 SARS-COV-2 COVID-19 AMP PRB: CPT | Mod: ER

## 2025-03-20 PROCEDURE — 87804 INFLUENZA ASSAY W/OPTIC: CPT | Mod: ER

## 2025-03-20 PROCEDURE — 99284 EMERGENCY DEPT VISIT MOD MDM: CPT | Mod: 25,ER

## 2025-03-20 RX ORDER — ACETAMINOPHEN 500 MG
1000 TABLET ORAL
Status: COMPLETED | OUTPATIENT
Start: 2025-03-20 | End: 2025-03-20

## 2025-03-20 RX ORDER — ACETAMINOPHEN 500 MG
500 TABLET ORAL EVERY 6 HOURS PRN
Qty: 20 TABLET | Refills: 0 | Status: SHIPPED | OUTPATIENT
Start: 2025-03-20

## 2025-03-20 RX ORDER — PREDNISONE 20 MG/1
40 TABLET ORAL DAILY
Qty: 10 TABLET | Refills: 0 | Status: SHIPPED | OUTPATIENT
Start: 2025-03-20 | End: 2025-03-25

## 2025-03-20 RX ORDER — ALBUTEROL SULFATE 90 UG/1
1-2 INHALANT RESPIRATORY (INHALATION) EVERY 6 HOURS PRN
Qty: 8 G | Refills: 0 | Status: SHIPPED | OUTPATIENT
Start: 2025-03-20 | End: 2026-03-20

## 2025-03-20 RX ORDER — FLUTICASONE PROPIONATE 50 MCG
1 SPRAY, SUSPENSION (ML) NASAL 2 TIMES DAILY PRN
Qty: 15 G | Refills: 0 | Status: SHIPPED | OUTPATIENT
Start: 2025-03-20

## 2025-03-20 RX ORDER — CETIRIZINE HYDROCHLORIDE 10 MG/1
10 TABLET ORAL DAILY
Qty: 30 TABLET | Refills: 0 | Status: SHIPPED | OUTPATIENT
Start: 2025-03-20 | End: 2025-04-19

## 2025-03-20 RX ORDER — BENZONATATE 100 MG/1
100 CAPSULE ORAL 3 TIMES DAILY PRN
Qty: 20 CAPSULE | Refills: 0 | Status: SHIPPED | OUTPATIENT
Start: 2025-03-20 | End: 2025-03-30

## 2025-03-20 RX ORDER — GUAIFENESIN 100 MG/5ML
100-200 LIQUID ORAL EVERY 4 HOURS PRN
Qty: 60 ML | Refills: 0 | Status: SHIPPED | OUTPATIENT
Start: 2025-03-20 | End: 2025-03-30

## 2025-03-20 RX ADMIN — ACETAMINOPHEN 1000 MG: 500 TABLET ORAL at 08:03

## 2025-03-20 NOTE — Clinical Note
"Zandra Kennedyady Phillips was seen and treated in our emergency department on 3/20/2025.  She may return to work on 03/23/2025.       If you have any questions or concerns, please don't hesitate to call.      Louis Marina PA-C"

## 2025-03-21 NOTE — ED PROVIDER NOTES
Encounter Date: 3/20/2025       History     Chief Complaint   Patient presents with    URI     C/O COUGH CONGESTION CHILLS X 2-3 DAYS     Patient is a 34-year-old female with a past medical history of asthma who presents to the emergency department for evaluation of URI symptoms x 3 days.  Symptoms include subjective fever/chills, cough, congestion.  Denies sore throat, otalgia.  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  Denies specific known sick contacts.  Denies chest pain, shortness of breath.  Denies headache, lightheadedness, dizziness.  No medications taken prior to arrival.  No further complaints.    The history is provided by the patient.     Review of patient's allergies indicates:  No Known Allergies  Past Medical History:   Diagnosis Date    Asthma      Past Surgical History:   Procedure Laterality Date     SECTION      TONSILLECTOMY       Family History   Problem Relation Name Age of Onset    Diabetes Other      Hypertension Other       Social History[1]  Review of Systems   Constitutional:  Positive for chills and fever.   HENT:  Positive for congestion. Negative for ear pain, rhinorrhea, sore throat and trouble swallowing.    Respiratory:  Positive for cough. Negative for shortness of breath.    Cardiovascular:  Negative for chest pain.   Gastrointestinal:  Negative for abdominal pain, diarrhea, nausea and vomiting.   Genitourinary:  Negative for decreased urine volume.   Musculoskeletal:  Negative for neck pain and neck stiffness.   Neurological:  Negative for dizziness, light-headedness and headaches.       Physical Exam     Initial Vitals [25]   BP Pulse Resp Temp SpO2   (!) 164/103 75 18 99 °F (37.2 °C) 98 %      MAP       --         Physical Exam    Nursing note and vitals reviewed.  Constitutional: She appears well-developed and well-nourished.   HENT:   Head: Normocephalic and atraumatic.   Right Ear: External ear normal.   Left Ear: External ear normal.   There is no posterior  oropharyngeal erythema however postnasal drip is present, no tonsillar swelling, no oropharyngeal exudates, uvula is midline. Normal dentition. No trismus.  No muffled voice. No submandibular swelling. Patient is tolerating secretions without difficulty.  Patient is speaking in full sentences on exam without difficulty.  Bilateral tympanic membranes are pearly gray without erythema, bulging, perforation.  There is no postauricular swelling, or overlying erythema or tenderness to palpation over mastoids bilaterally.  There is minimal wheezing heard in the upper lung fields bilaterally with expiration.   Neck: Carotid bruit is not present.   Normal range of motion.  Cardiovascular:  Normal rate, regular rhythm, normal heart sounds and intact distal pulses.     Exam reveals no gallop and no friction rub.       No murmur heard.  Pulmonary/Chest: No respiratory distress. She has wheezes. She has no rhonchi. She has no rales.   Abdominal: Abdomen is soft. Bowel sounds are normal. She exhibits no distension. There is no abdominal tenderness. There is no rebound and no guarding.   Musculoskeletal:         General: Normal range of motion.      Cervical back: Normal range of motion.     Neurological: She is alert and oriented to person, place, and time. GCS score is 15. GCS eye subscore is 4. GCS verbal subscore is 5. GCS motor subscore is 6.   Psychiatric: She has a normal mood and affect.         ED Course   Procedures  Labs Reviewed   SARS-COV-2 RDRP GENE       Result Value    POC Rapid COVID Negative       Acceptable Yes      Narrative:     This test utilizes isothermal nucleic acid amplification technology to detect the SARS-CoV-2 RdRp nucleic acid segment. The analytical sensitivity (limit of detection) is 500 copies/swab.     A POSITIVE result is indicative of the presence of SARS-CoV-2 RNA; clinical correlation with patient history and other diagnostic information is necessary to determine patient  infection status.    A NEGATIVE result means that SARS-CoV-2 nucleic acids are not present above the limit of detection. A NEGATIVE result should be treated as presumptive. It does not rule out the possibility of COVID-19 and should not be the sole basis for treatment decisions. If COVID-19 is strongly suspected based on clinical and exposure history, re-testing using an alternate molecular assay should be considered.     Commercial kits are provided by [employer].        POCT URINE PREGNANCY    POC Preg Test, Ur Negative       Acceptable Yes     POCT INFLUENZA A/B MOLECULAR   POCT RAPID INFLUENZA A/B    Influenza B Ag negative      Inflenza A Ag negative            Imaging Results    None          Medications   acetaminophen tablet 1,000 mg (1,000 mg Oral Given 3/20/25 2023)     Medical Decision Making  This is an emergent evaluation of a 34-year-old female with a past medical history of asthma who presents to the emergency department for evaluation of URI symptoms x 3 days.  Symptoms include subjective fever/chills, cough, congestion.    Physical exam as above.    Differential diagnosis includes but is not limited to COVID, flu, other viral syndrome.  Considered but doubt pneumonia.    Workup initiated with viral swabs, UPT.  Ordered Tylenol.  Vital signs, chart, labs, and/or imaging were all reviewed.  See ED course below and interpretations above. My overall impression is viral uri with cough. Will discharge home with meds as listed below. Vital signs are reassuring. Patient/Caregiver is stable for discharge at this time.  Patient/Caregiver was informed of results, plan of care, and are comfortable with this.  All questions and concerns were addressed. Discussed strict return precautions with the patient/caregiver. Instructed follow up with primary care provider within 1 week.      Louis Marina PA-C    DISCLAIMER: This note was prepared with MModal voice recognition transcription  software. Garbled syntax, mangled pronouns, and other bizarre constructions may be attributed to that software system.       Amount and/or Complexity of Data Reviewed  Labs: ordered. Decision-making details documented in ED Course.    Risk  OTC drugs.  Prescription drug management.               ED Course as of 03/20/25 2123   Thu Mar 20, 2025   2112 BP(!): 164/103 [TM]   2112 Temp: 99 °F (37.2 °C) [TM]   2112 Pulse: 75 [TM]   2112 Resp: 18 [TM]   2112 SpO2: 98 % [TM]   2112 POCT COVID-19 Rapid Screening  neg [TM]   2112 POCT urine pregnancy  neg [TM]   2112 POCT Rapid Influenza A/B  neg [TM]      ED Course User Index  [TM] Louis Marina PA-C                           Clinical Impression:  Final diagnoses:  [J06.9] Viral URI with cough (Primary)          ED Disposition Condition    Discharge Stable          ED Prescriptions       Medication Sig Dispense Start Date End Date Auth. Provider    predniSONE (DELTASONE) 20 MG tablet Take 2 tablets (40 mg total) by mouth once daily. for 5 days 10 tablet 3/20/2025 3/25/2025 Louis Marina PA-C    albuterol (PROVENTIL/VENTOLIN HFA) 90 mcg/actuation inhaler Inhale 1-2 puffs into the lungs every 6 (six) hours as needed for Wheezing. Rescue 8 g 3/20/2025 3/20/2026 Louis Marina PA-C    cetirizine (ZYRTEC) 10 MG tablet Take 1 tablet (10 mg total) by mouth once daily. 30 tablet 3/20/2025 4/19/2025 Louis Marina PA-C    fluticasone propionate (FLONASE) 50 mcg/actuation nasal spray 1 spray (50 mcg total) by Each Nostril route 2 (two) times daily as needed for Rhinitis. 15 g 3/20/2025 -- Louis Marina PA-MANOJ    benzonatate (TESSALON) 100 MG capsule Take 1 capsule (100 mg total) by mouth 3 (three) times daily as needed. 20 capsule 3/20/2025 3/30/2025 Louis Marina PA-MANOJ    guaiFENesin 100 mg/5 ml (ROBITUSSIN) 100 mg/5 mL syrup Take 5-10 mLs (100-200 mg total) by mouth every 4 (four) hours as needed for Cough. 60 mL 3/20/2025 3/30/2025 Louis Marina PA-C     acetaminophen (TYLENOL) 500 MG tablet Take 1 tablet (500 mg total) by mouth every 6 (six) hours as needed. 20 tablet 3/20/2025 -- Louis Marina PA-C          Follow-up Information       Follow up With Specialties Details Why Contact Bubba    [facility] ED Emergency Medicine Go to  As needed, If symptoms worsen, or new symptoms develop [address]  [facility] [address] [phone]    Primary care doctor  Schedule an appointment as soon as possible for a visit in 3 days                 [1]   Social History  Tobacco Use    Smoking status: Some Days    Smokeless tobacco: Never    Tobacco comments:     Pt currently smokes marijuana   Substance Use Topics    Alcohol use: Yes    Drug use: Not Currently        Louis Marnia PA-C  03/20/25 [id number]

## 2025-03-21 NOTE — DISCHARGE INSTRUCTIONS
You were seen in the emergency department today for viral illness.  Please take all medications as prescribed and as we discussed.  Follow-up with specialist if instructed to do so.  It is important to remember that some problems are difficult to diagnose and may not be found during your Emergency Department visit. Be sure to follow up with your primary care doctor and review all labs/imaging/tests that were performed during this visit with them. Some labs/tests may be outside of the normal range and require non-emergent follow-up and further investigation to help diagnose/exclude/prevent complications or other medical conditions. Return to the emergency department for any new or worsening symptoms. Thank you for allowing me to care for you today, it was my pleasure. I hope you get to feeling better soon!   Yes